# Patient Record
Sex: FEMALE | Race: OTHER | Employment: OTHER | URBAN - NONMETROPOLITAN AREA
[De-identification: names, ages, dates, MRNs, and addresses within clinical notes are randomized per-mention and may not be internally consistent; named-entity substitution may affect disease eponyms.]

---

## 2019-01-02 ENCOUNTER — APPOINTMENT (OUTPATIENT)
Dept: CT IMAGING | Age: 79
DRG: 086 | End: 2019-01-02
Payer: MEDICARE

## 2019-01-02 ENCOUNTER — APPOINTMENT (OUTPATIENT)
Dept: GENERAL RADIOLOGY | Age: 79
DRG: 086 | End: 2019-01-02
Payer: MEDICARE

## 2019-01-02 ENCOUNTER — HOSPITAL ENCOUNTER (INPATIENT)
Age: 79
LOS: 2 days | Discharge: HOME | DRG: 086 | End: 2019-01-04
Attending: FAMILY MEDICINE | Admitting: SURGERY
Payer: MEDICARE

## 2019-01-02 DIAGNOSIS — S06.310A: ICD-10-CM
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
## 2019-01-02 DIAGNOSIS — I60.9 SAH (SUBARACHNOID HEMORRHAGE) (HCC): ICD-10-CM

## 2019-01-02 PROBLEM — S00.12XA PERIORBITAL ECCHYMOSIS OF LEFT EYE: Status: ACTIVE | Noted: 2019-01-02

## 2019-01-02 PROBLEM — S22.000A CLOSED COMPRESSION FRACTURE OF THORACIC VERTEBRA (HCC): Status: ACTIVE | Noted: 2019-01-02

## 2019-01-02 PROBLEM — F17.210 DEPENDENCE ON NICOTINE FROM CIGARETTES: Status: ACTIVE | Noted: 2019-01-02

## 2019-01-02 PROBLEM — S32.010A CLOSED COMPRESSION FRACTURE OF FIRST LUMBAR VERTEBRA (HCC): Status: ACTIVE | Noted: 2019-01-02

## 2019-01-02 LAB
MRSA SCREEN RT-PCR: NEGATIVE
VANCOMYCIN RESISTANT ENTEROCOCCUS: NEGATIVE

## 2019-01-02 PROCEDURE — 2709999900 HC NON-CHARGEABLE SUPPLY

## 2019-01-02 PROCEDURE — 3209999900 CT INTERPRETATION OF OUTSIDE IMAGES

## 2019-01-02 PROCEDURE — APPSS180 APP SPLIT SHARED TIME > 60 MINUTES: Performed by: NURSE PRACTITIONER

## 2019-01-02 PROCEDURE — 6370000000 HC RX 637 (ALT 250 FOR IP): Performed by: INTERNAL MEDICINE

## 2019-01-02 PROCEDURE — 76705 ECHO EXAM OF ABDOMEN: CPT

## 2019-01-02 PROCEDURE — 99285 EMERGENCY DEPT VISIT HI MDM: CPT

## 2019-01-02 PROCEDURE — 2000000000 HC ICU R&B

## 2019-01-02 PROCEDURE — 87500 VANOMYCIN DNA AMP PROBE: CPT

## 2019-01-02 PROCEDURE — 2580000003 HC RX 258: Performed by: FAMILY MEDICINE

## 2019-01-02 PROCEDURE — 99221 1ST HOSP IP/OBS SF/LOW 40: CPT | Performed by: NEUROLOGICAL SURGERY

## 2019-01-02 PROCEDURE — 3209999900

## 2019-01-02 PROCEDURE — 71260 CT THORAX DX C+: CPT

## 2019-01-02 PROCEDURE — 73130 X-RAY EXAM OF HAND: CPT

## 2019-01-02 PROCEDURE — 87641 MR-STAPH DNA AMP PROBE: CPT

## 2019-01-02 PROCEDURE — 87081 CULTURE SCREEN ONLY: CPT

## 2019-01-02 PROCEDURE — 99223 1ST HOSP IP/OBS HIGH 75: CPT | Performed by: SURGERY

## 2019-01-02 PROCEDURE — 74177 CT ABD & PELVIS W/CONTRAST: CPT

## 2019-01-02 PROCEDURE — 6820000002 HC L2 INJURY CALL ACTIVATION

## 2019-01-02 PROCEDURE — 3209999900 XR INTERPRETATION OUTSIDE FILMS

## 2019-01-02 PROCEDURE — 6360000004 HC RX CONTRAST MEDICATION: Performed by: FAMILY MEDICINE

## 2019-01-02 RX ORDER — ACETAMINOPHEN 325 MG/1
650 TABLET ORAL EVERY 4 HOURS PRN
Status: DISCONTINUED | OUTPATIENT
Start: 2019-01-02 | End: 2019-01-05 | Stop reason: HOSPADM

## 2019-01-02 RX ORDER — HYDROCODONE BITARTRATE AND ACETAMINOPHEN 5; 325 MG/1; MG/1
2 TABLET ORAL EVERY 4 HOURS PRN
Status: DISCONTINUED | OUTPATIENT
Start: 2019-01-02 | End: 2019-01-05 | Stop reason: HOSPADM

## 2019-01-02 RX ORDER — LISINOPRIL 20 MG/1
20 TABLET ORAL DAILY
Status: DISCONTINUED | OUTPATIENT
Start: 2019-01-02 | End: 2019-01-05 | Stop reason: HOSPADM

## 2019-01-02 RX ORDER — SODIUM CHLORIDE 0.9 % (FLUSH) 0.9 %
10 SYRINGE (ML) INJECTION PRN
Status: DISCONTINUED | OUTPATIENT
Start: 2019-01-02 | End: 2019-01-05 | Stop reason: HOSPADM

## 2019-01-02 RX ORDER — ALENDRONATE SODIUM 70 MG/1
70 TABLET ORAL
COMMUNITY

## 2019-01-02 RX ORDER — ALENDRONATE SODIUM 70 MG/1
70 TABLET ORAL
Status: DISCONTINUED | OUTPATIENT
Start: 2019-01-02 | End: 2019-01-02 | Stop reason: RX

## 2019-01-02 RX ORDER — SODIUM CHLORIDE 0.9 % (FLUSH) 0.9 %
10 SYRINGE (ML) INJECTION EVERY 12 HOURS SCHEDULED
Status: DISCONTINUED | OUTPATIENT
Start: 2019-01-02 | End: 2019-01-05 | Stop reason: HOSPADM

## 2019-01-02 RX ORDER — HYDROCODONE BITARTRATE AND ACETAMINOPHEN 5; 325 MG/1; MG/1
1 TABLET ORAL EVERY 4 HOURS PRN
Status: DISCONTINUED | OUTPATIENT
Start: 2019-01-02 | End: 2019-01-05 | Stop reason: HOSPADM

## 2019-01-02 RX ORDER — HYDROCHLOROTHIAZIDE 25 MG/1
12.5 TABLET ORAL DAILY
Status: DISCONTINUED | OUTPATIENT
Start: 2019-01-02 | End: 2019-01-05 | Stop reason: HOSPADM

## 2019-01-02 RX ORDER — DOCUSATE SODIUM 100 MG/1
100 CAPSULE, LIQUID FILLED ORAL 2 TIMES DAILY
Status: DISCONTINUED | OUTPATIENT
Start: 2019-01-02 | End: 2019-01-05 | Stop reason: HOSPADM

## 2019-01-02 RX ORDER — FAMOTIDINE 20 MG/1
20 TABLET, FILM COATED ORAL 2 TIMES DAILY
Status: DISCONTINUED | OUTPATIENT
Start: 2019-01-02 | End: 2019-01-05 | Stop reason: HOSPADM

## 2019-01-02 RX ORDER — LISINOPRIL AND HYDROCHLOROTHIAZIDE 20; 12.5 MG/1; MG/1
1 TABLET ORAL DAILY
Status: DISCONTINUED | OUTPATIENT
Start: 2019-01-02 | End: 2019-01-02 | Stop reason: SDUPTHER

## 2019-01-02 RX ORDER — NICOTINE 21 MG/24HR
1 PATCH, TRANSDERMAL 24 HOURS TRANSDERMAL DAILY
Status: DISCONTINUED | OUTPATIENT
Start: 2019-01-02 | End: 2019-01-05 | Stop reason: HOSPADM

## 2019-01-02 RX ORDER — BISACODYL 10 MG
10 SUPPOSITORY, RECTAL RECTAL DAILY PRN
Status: DISCONTINUED | OUTPATIENT
Start: 2019-01-02 | End: 2019-01-05 | Stop reason: HOSPADM

## 2019-01-02 RX ORDER — MORPHINE SULFATE 4 MG/ML
4 INJECTION, SOLUTION INTRAMUSCULAR; INTRAVENOUS
Status: DISCONTINUED | OUTPATIENT
Start: 2019-01-02 | End: 2019-01-05 | Stop reason: HOSPADM

## 2019-01-02 RX ORDER — SODIUM CHLORIDE 9 MG/ML
INJECTION, SOLUTION INTRAVENOUS CONTINUOUS
Status: DISCONTINUED | OUTPATIENT
Start: 2019-01-02 | End: 2019-01-05 | Stop reason: HOSPADM

## 2019-01-02 RX ORDER — MORPHINE SULFATE 2 MG/ML
2 INJECTION, SOLUTION INTRAMUSCULAR; INTRAVENOUS
Status: DISCONTINUED | OUTPATIENT
Start: 2019-01-02 | End: 2019-01-05 | Stop reason: HOSPADM

## 2019-01-02 RX ORDER — SODIUM CHLORIDE 9 MG/ML
INJECTION, SOLUTION INTRAVENOUS CONTINUOUS
Status: DISCONTINUED | OUTPATIENT
Start: 2019-01-02 | End: 2019-01-02 | Stop reason: SDUPTHER

## 2019-01-02 RX ORDER — ONDANSETRON 2 MG/ML
4 INJECTION INTRAMUSCULAR; INTRAVENOUS EVERY 6 HOURS PRN
Status: DISCONTINUED | OUTPATIENT
Start: 2019-01-02 | End: 2019-01-05 | Stop reason: HOSPADM

## 2019-01-02 RX ADMIN — IOPAMIDOL 80 ML: 755 INJECTION, SOLUTION INTRAVENOUS at 14:06

## 2019-01-02 RX ADMIN — SODIUM CHLORIDE: 9 INJECTION, SOLUTION INTRAVENOUS at 12:48

## 2019-01-02 RX ADMIN — HYDROCHLOROTHIAZIDE 12.5 MG: 25 TABLET ORAL at 20:59

## 2019-01-02 RX ADMIN — LISINOPRIL 20 MG: 20 TABLET ORAL at 20:59

## 2019-01-02 ASSESSMENT — ENCOUNTER SYMPTOMS
CHEST TIGHTNESS: 0
DIARRHEA: 0
FACIAL SWELLING: 1
ABDOMINAL PAIN: 0
SHORTNESS OF BREATH: 0
TROUBLE SWALLOWING: 0
RHINORRHEA: 0
ABDOMINAL DISTENTION: 0
BACK PAIN: 0
FACIAL SWELLING: 0
CHOKING: 0
SORE THROAT: 0
SINUS PRESSURE: 0
EYE DISCHARGE: 0
STRIDOR: 0
VOMITING: 0
EYE PAIN: 0
VOICE CHANGE: 0
COLOR CHANGE: 0
WHEEZING: 0
NAUSEA: 0
APNEA: 0
EYE ITCHING: 0
COUGH: 0
CONSTIPATION: 0
PHOTOPHOBIA: 0
EYE REDNESS: 0
BLOOD IN STOOL: 0

## 2019-01-03 ENCOUNTER — APPOINTMENT (OUTPATIENT)
Dept: CT IMAGING | Age: 79
DRG: 086 | End: 2019-01-03
Payer: MEDICARE

## 2019-01-03 LAB
ANION GAP SERPL CALCULATED.3IONS-SCNC: 10 MEQ/L (ref 8–16)
BUN BLDV-MCNC: 8 MG/DL (ref 7–22)
CALCIUM SERPL-MCNC: 9 MG/DL (ref 8.5–10.5)
CHLORIDE BLD-SCNC: 104 MEQ/L (ref 98–111)
CO2: 25 MEQ/L (ref 23–33)
CREAT SERPL-MCNC: 0.6 MG/DL (ref 0.4–1.2)
ERYTHROCYTE [DISTWIDTH] IN BLOOD BY AUTOMATED COUNT: 13.1 % (ref 11.5–14.5)
ERYTHROCYTE [DISTWIDTH] IN BLOOD BY AUTOMATED COUNT: 43.3 FL (ref 35–45)
GFR SERPL CREATININE-BSD FRML MDRD: > 90 ML/MIN/1.73M2
GLUCOSE BLD-MCNC: 143 MG/DL (ref 70–108)
HCT VFR BLD CALC: 39.1 % (ref 37–47)
HEMOGLOBIN: 13.2 GM/DL (ref 12–16)
MCH RBC QN AUTO: 30.6 PG (ref 26–33)
MCHC RBC AUTO-ENTMCNC: 33.8 GM/DL (ref 32.2–35.5)
MCV RBC AUTO: 90.7 FL (ref 81–99)
PLATELET # BLD: 250 THOU/MM3 (ref 130–400)
PMV BLD AUTO: 10.4 FL (ref 9.4–12.4)
POTASSIUM REFLEX MAGNESIUM: 3.9 MEQ/L (ref 3.5–5.2)
RBC # BLD: 4.31 MILL/MM3 (ref 4.2–5.4)
SODIUM BLD-SCNC: 139 MEQ/L (ref 135–145)
WBC # BLD: 10.9 THOU/MM3 (ref 4.8–10.8)

## 2019-01-03 PROCEDURE — G8989 SELF CARE D/C STATUS: HCPCS

## 2019-01-03 PROCEDURE — G8987 SELF CARE CURRENT STATUS: HCPCS

## 2019-01-03 PROCEDURE — APPSS45 APP SPLIT SHARED TIME 31-45 MINUTES: Performed by: PHYSICIAN ASSISTANT

## 2019-01-03 PROCEDURE — G8978 MOBILITY CURRENT STATUS: HCPCS

## 2019-01-03 PROCEDURE — 6370000000 HC RX 637 (ALT 250 FOR IP): Performed by: NURSE PRACTITIONER

## 2019-01-03 PROCEDURE — G8979 MOBILITY GOAL STATUS: HCPCS

## 2019-01-03 PROCEDURE — 2709999900 HC NON-CHARGEABLE SUPPLY

## 2019-01-03 PROCEDURE — 97535 SELF CARE MNGMENT TRAINING: CPT

## 2019-01-03 PROCEDURE — 97166 OT EVAL MOD COMPLEX 45 MIN: CPT

## 2019-01-03 PROCEDURE — 97110 THERAPEUTIC EXERCISES: CPT

## 2019-01-03 PROCEDURE — 97162 PT EVAL MOD COMPLEX 30 MIN: CPT

## 2019-01-03 PROCEDURE — 2580000003 HC RX 258: Performed by: NURSE PRACTITIONER

## 2019-01-03 PROCEDURE — 99232 SBSQ HOSP IP/OBS MODERATE 35: CPT | Performed by: SURGERY

## 2019-01-03 PROCEDURE — 80048 BASIC METABOLIC PNL TOTAL CA: CPT

## 2019-01-03 PROCEDURE — 92523 SPEECH SOUND LANG COMPREHEN: CPT

## 2019-01-03 PROCEDURE — 2060000000 HC ICU INTERMEDIATE R&B

## 2019-01-03 PROCEDURE — 99231 SBSQ HOSP IP/OBS SF/LOW 25: CPT | Performed by: PHYSICIAN ASSISTANT

## 2019-01-03 PROCEDURE — 36415 COLL VENOUS BLD VENIPUNCTURE: CPT

## 2019-01-03 PROCEDURE — G8988 SELF CARE GOAL STATUS: HCPCS

## 2019-01-03 PROCEDURE — 85027 COMPLETE CBC AUTOMATED: CPT

## 2019-01-03 PROCEDURE — 6370000000 HC RX 637 (ALT 250 FOR IP): Performed by: INTERNAL MEDICINE

## 2019-01-03 PROCEDURE — 70450 CT HEAD/BRAIN W/O DYE: CPT

## 2019-01-03 RX ADMIN — DOCUSATE SODIUM 100 MG: 100 CAPSULE, LIQUID FILLED ORAL at 20:18

## 2019-01-03 RX ADMIN — Medication 10 ML: at 08:31

## 2019-01-03 RX ADMIN — FAMOTIDINE 20 MG: 20 TABLET ORAL at 20:18

## 2019-01-03 RX ADMIN — FAMOTIDINE 20 MG: 20 TABLET ORAL at 08:31

## 2019-01-03 RX ADMIN — LISINOPRIL 20 MG: 20 TABLET ORAL at 08:31

## 2019-01-03 RX ADMIN — HYDROCHLOROTHIAZIDE 12.5 MG: 25 TABLET ORAL at 08:31

## 2019-01-03 RX ADMIN — ACETAMINOPHEN 650 MG: 325 TABLET ORAL at 20:23

## 2019-01-03 RX ADMIN — Medication 10 ML: at 20:20

## 2019-01-03 RX ADMIN — DOCUSATE SODIUM 100 MG: 100 CAPSULE, LIQUID FILLED ORAL at 08:31

## 2019-01-03 ASSESSMENT — ENCOUNTER SYMPTOMS
BACK PAIN: 0
ABDOMINAL PAIN: 0
SHORTNESS OF BREATH: 0
CHEST TIGHTNESS: 0
ABDOMINAL DISTENTION: 0

## 2019-01-03 ASSESSMENT — PAIN SCALES - GENERAL
PAINLEVEL_OUTOF10: 3
PAINLEVEL_OUTOF10: 0
PAINLEVEL_OUTOF10: 0
PAINLEVEL_OUTOF10: 3

## 2019-01-04 VITALS
BODY MASS INDEX: 27.16 KG/M2 | RESPIRATION RATE: 16 BRPM | OXYGEN SATURATION: 95 % | WEIGHT: 163 LBS | SYSTOLIC BLOOD PRESSURE: 122 MMHG | HEIGHT: 65 IN | TEMPERATURE: 97.8 F | HEART RATE: 94 BPM | DIASTOLIC BLOOD PRESSURE: 81 MMHG

## 2019-01-04 LAB — MRSA SCREEN: NORMAL

## 2019-01-04 PROCEDURE — 97116 GAIT TRAINING THERAPY: CPT

## 2019-01-04 PROCEDURE — 97110 THERAPEUTIC EXERCISES: CPT

## 2019-01-04 PROCEDURE — 6370000000 HC RX 637 (ALT 250 FOR IP): Performed by: INTERNAL MEDICINE

## 2019-01-04 PROCEDURE — 97127 HC SP THER IVNTJ W/FOCUS COG FUNCJ: CPT

## 2019-01-04 PROCEDURE — 6370000000 HC RX 637 (ALT 250 FOR IP): Performed by: NURSE PRACTITIONER

## 2019-01-04 PROCEDURE — 99231 SBSQ HOSP IP/OBS SF/LOW 25: CPT | Performed by: PHYSICIAN ASSISTANT

## 2019-01-04 PROCEDURE — 2580000003 HC RX 258: Performed by: NURSE PRACTITIONER

## 2019-01-04 RX ORDER — HYDROCODONE BITARTRATE AND ACETAMINOPHEN 5; 325 MG/1; MG/1
1-2 TABLET ORAL EVERY 4 HOURS PRN
Qty: 25 TABLET | Refills: 0 | Status: SHIPPED | OUTPATIENT
Start: 2019-01-04 | End: 2019-01-07

## 2019-01-04 RX ADMIN — FAMOTIDINE 20 MG: 20 TABLET ORAL at 08:54

## 2019-01-04 RX ADMIN — Medication 10 ML: at 08:54

## 2019-01-04 RX ADMIN — LISINOPRIL 20 MG: 20 TABLET ORAL at 08:53

## 2019-01-04 RX ADMIN — DOCUSATE SODIUM 100 MG: 100 CAPSULE, LIQUID FILLED ORAL at 21:17

## 2019-01-04 RX ADMIN — HYDROCHLOROTHIAZIDE 12.5 MG: 25 TABLET ORAL at 08:53

## 2019-01-04 RX ADMIN — FAMOTIDINE 20 MG: 20 TABLET ORAL at 21:17

## 2019-01-04 RX ADMIN — DOCUSATE SODIUM 100 MG: 100 CAPSULE, LIQUID FILLED ORAL at 08:54

## 2019-01-04 ASSESSMENT — PAIN DESCRIPTION - PAIN TYPE: TYPE: ACUTE PAIN

## 2019-01-04 ASSESSMENT — PAIN SCALES - GENERAL: PAINLEVEL_OUTOF10: 0

## 2019-01-04 ASSESSMENT — PAIN DESCRIPTION - ORIENTATION: ORIENTATION: LEFT

## 2019-01-04 ASSESSMENT — PAIN DESCRIPTION - LOCATION: LOCATION: HAND
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
## 2019-10-29 ENCOUNTER — APPOINTMENT (OUTPATIENT)
Dept: CT IMAGING | Age: 79
DRG: 086 | End: 2019-10-29
Payer: MEDICARE

## 2019-10-29 ENCOUNTER — HOSPITAL ENCOUNTER (INPATIENT)
Age: 79
LOS: 3 days | Discharge: NURSING, REHAB OR LONG-TERM CARE | DRG: 086 | End: 2019-11-01
Attending: FAMILY MEDICINE | Admitting: SURGERY
Payer: MEDICARE
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
## 2019-10-29 PROBLEM — S22.060A COMPRESSION FRACTURE OF T8 VERTEBRA (HCC): Status: ACTIVE | Noted: 2019-10-29

## 2019-10-29 PROBLEM — S09.90XA CLOSED HEAD INJURY: Status: ACTIVE | Noted: 2019-10-29

## 2019-10-29 PROBLEM — I60.9 SUBARACHNOID BLEED (HCC): Status: ACTIVE | Noted: 2019-10-29

## 2019-10-29 PROBLEM — I62.00 SUBDURAL BLEEDING (HCC): Status: ACTIVE | Noted: 2019-10-29

## 2019-10-29 LAB
ALBUMIN SERPL-MCNC: 3.6 G/DL (ref 3.5–5.1)
ALP BLD-CCNC: 92 U/L (ref 38–126)
ALT SERPL-CCNC: 34 U/L (ref 11–66)
AMORPHOUS: ABNORMAL
ANION GAP SERPL CALCULATED.3IONS-SCNC: 12 MEQ/L (ref 8–16)
ANION GAP SERPL CALCULATED.3IONS-SCNC: 14 MEQ/L (ref 8–16)
AST SERPL-CCNC: 33 U/L (ref 5–40)
AVERAGE GLUCOSE: 147 MG/DL (ref 70–126)
BACTERIA: ABNORMAL
BILIRUB SERPL-MCNC: 0.7 MG/DL (ref 0.3–1.2)
BILIRUBIN URINE: ABNORMAL
BLOOD, URINE: ABNORMAL
BUN BLDV-MCNC: 11 MG/DL (ref 7–22)
BUN BLDV-MCNC: 14 MG/DL (ref 7–22)
CALCIUM SERPL-MCNC: 8.9 MG/DL (ref 8.5–10.5)
CALCIUM SERPL-MCNC: 9.3 MG/DL (ref 8.5–10.5)
CASTS: ABNORMAL /LPF
CASTS: ABNORMAL /LPF
CHARACTER, URINE: CLEAR
CHLORIDE BLD-SCNC: 103 MEQ/L (ref 98–111)
CHLORIDE BLD-SCNC: 104 MEQ/L (ref 98–111)
CO2: 24 MEQ/L (ref 23–33)
CO2: 25 MEQ/L (ref 23–33)
COLOR: YELLOW
CREAT SERPL-MCNC: 0.5 MG/DL (ref 0.4–1.2)
CREAT SERPL-MCNC: 0.6 MG/DL (ref 0.4–1.2)
CRYSTALS: ABNORMAL
EPITHELIAL CELLS, UA: ABNORMAL /HPF
ERYTHROCYTE [DISTWIDTH] IN BLOOD BY AUTOMATED COUNT: 13.3 % (ref 11.5–14.5)
ERYTHROCYTE [DISTWIDTH] IN BLOOD BY AUTOMATED COUNT: 43.6 FL (ref 35–45)
GFR SERPL CREATININE-BSD FRML MDRD: > 90 ML/MIN/1.73M2
GFR SERPL CREATININE-BSD FRML MDRD: > 90 ML/MIN/1.73M2
GLUCOSE BLD-MCNC: 150 MG/DL (ref 70–108)
GLUCOSE BLD-MCNC: 162 MG/DL (ref 70–108)
GLUCOSE, URINE: NEGATIVE MG/DL
HBA1C MFR BLD: 6.9 % (ref 4.4–6.4)
HCT VFR BLD CALC: 41.8 % (ref 37–47)
HEMOGLOBIN: 13.8 GM/DL (ref 12–16)
ICTOTEST: NEGATIVE
KETONES, URINE: NEGATIVE
LEUKOCYTE EST, POC: NEGATIVE
MAGNESIUM: 2.1 MG/DL (ref 1.6–2.4)
MCH RBC QN AUTO: 29.7 PG (ref 26–33)
MCHC RBC AUTO-ENTMCNC: 33 GM/DL (ref 32.2–35.5)
MCV RBC AUTO: 90.1 FL (ref 81–99)
MISCELLANEOUS LAB TEST RESULT: ABNORMAL
MRSA SCREEN RT-PCR: NEGATIVE
MUCUS: ABNORMAL
NITRITE, URINE: NEGATIVE
PH UA: 7 (ref 5–9)
PLATELET # BLD: 245 THOU/MM3 (ref 130–400)
PMV BLD AUTO: 10.4 FL (ref 9.4–12.4)
POTASSIUM REFLEX MAGNESIUM: 3.1 MEQ/L (ref 3.5–5.2)
POTASSIUM SERPL-SCNC: 3.9 MEQ/L (ref 3.5–5.2)
PROTEIN UA: ABNORMAL MG/DL
RBC # BLD: 4.64 MILL/MM3 (ref 4.2–5.4)
RBC URINE: ABNORMAL /HPF
RENAL EPITHELIAL, UA: ABNORMAL
SODIUM BLD-SCNC: 141 MEQ/L (ref 135–145)
SODIUM BLD-SCNC: 141 MEQ/L (ref 135–145)
SPECIFIC GRAVITY UA: > 1.03 (ref 1–1.03)
TOTAL PROTEIN: 7 G/DL (ref 6.1–8)
UROBILINOGEN, URINE: 1 EU/DL (ref 0–1)
VANCOMYCIN RESISTANT ENTEROCOCCUS: NEGATIVE
WBC # BLD: 10.2 THOU/MM3 (ref 4.8–10.8)
WBC UA: ABNORMAL /HPF
YEAST: ABNORMAL

## 2019-10-29 PROCEDURE — 99222 1ST HOSP IP/OBS MODERATE 55: CPT | Performed by: SURGERY

## 2019-10-29 PROCEDURE — 2580000003 HC RX 258: Performed by: PHYSICIAN ASSISTANT

## 2019-10-29 PROCEDURE — 87500 VANOMYCIN DNA AMP PROBE: CPT

## 2019-10-29 PROCEDURE — 6370000000 HC RX 637 (ALT 250 FOR IP): Performed by: PHYSICIAN ASSISTANT

## 2019-10-29 PROCEDURE — L0120 CERV FLEX N/ADJ FOAM PRE OTS: HCPCS

## 2019-10-29 PROCEDURE — 2580000003 HC RX 258: Performed by: FAMILY MEDICINE

## 2019-10-29 PROCEDURE — 2709999900 HC NON-CHARGEABLE SUPPLY

## 2019-10-29 PROCEDURE — 2060000000 HC ICU INTERMEDIATE R&B

## 2019-10-29 PROCEDURE — 99223 1ST HOSP IP/OBS HIGH 75: CPT | Performed by: INTERNAL MEDICINE

## 2019-10-29 PROCEDURE — 87086 URINE CULTURE/COLONY COUNT: CPT

## 2019-10-29 PROCEDURE — 6820000002 HC L2 INJURY CALL ACTIVATION: Performed by: SURGERY

## 2019-10-29 PROCEDURE — 99285 EMERGENCY DEPT VISIT HI MDM: CPT

## 2019-10-29 PROCEDURE — 92610 EVALUATE SWALLOWING FUNCTION: CPT

## 2019-10-29 PROCEDURE — 94761 N-INVAS EAR/PLS OXIMETRY MLT: CPT

## 2019-10-29 PROCEDURE — 80048 BASIC METABOLIC PNL TOTAL CA: CPT

## 2019-10-29 PROCEDURE — 3209999900 CT INTERPRETATION OF OUTSIDE IMAGES

## 2019-10-29 PROCEDURE — 87081 CULTURE SCREEN ONLY: CPT

## 2019-10-29 PROCEDURE — 87641 MR-STAPH DNA AMP PROBE: CPT

## 2019-10-29 PROCEDURE — 83036 HEMOGLOBIN GLYCOSYLATED A1C: CPT

## 2019-10-29 PROCEDURE — 36415 COLL VENOUS BLD VENIPUNCTURE: CPT

## 2019-10-29 PROCEDURE — 83735 ASSAY OF MAGNESIUM: CPT

## 2019-10-29 PROCEDURE — 80053 COMPREHEN METABOLIC PANEL: CPT

## 2019-10-29 PROCEDURE — 6370000000 HC RX 637 (ALT 250 FOR IP): Performed by: SURGERY

## 2019-10-29 PROCEDURE — 6360000002 HC RX W HCPCS: Performed by: PHYSICIAN ASSISTANT

## 2019-10-29 PROCEDURE — 81001 URINALYSIS AUTO W/SCOPE: CPT

## 2019-10-29 PROCEDURE — 85027 COMPLETE CBC AUTOMATED: CPT

## 2019-10-29 PROCEDURE — 70450 CT HEAD/BRAIN W/O DYE: CPT

## 2019-10-29 PROCEDURE — APPSS180 APP SPLIT SHARED TIME > 60 MINUTES: Performed by: PHYSICIAN ASSISTANT

## 2019-10-29 RX ORDER — SODIUM CHLORIDE 9 MG/ML
INJECTION, SOLUTION INTRAVENOUS CONTINUOUS
Status: DISCONTINUED | OUTPATIENT
Start: 2019-10-29 | End: 2019-10-31

## 2019-10-29 RX ORDER — TRAMADOL HYDROCHLORIDE 50 MG/1
50 TABLET ORAL EVERY 6 HOURS PRN
Status: DISCONTINUED | OUTPATIENT
Start: 2019-10-29 | End: 2019-11-01 | Stop reason: HOSPADM

## 2019-10-29 RX ORDER — MORPHINE SULFATE 4 MG/ML
4 INJECTION, SOLUTION INTRAMUSCULAR; INTRAVENOUS
Status: DISCONTINUED | OUTPATIENT
Start: 2019-10-29 | End: 2019-11-01 | Stop reason: HOSPADM

## 2019-10-29 RX ORDER — HYDROCODONE BITARTRATE AND ACETAMINOPHEN 5; 325 MG/1; MG/1
1 TABLET ORAL EVERY 4 HOURS PRN
Status: DISCONTINUED | OUTPATIENT
Start: 2019-10-29 | End: 2019-10-29

## 2019-10-29 RX ORDER — HYDROCODONE BITARTRATE AND ACETAMINOPHEN 5; 325 MG/1; MG/1
2 TABLET ORAL EVERY 4 HOURS PRN
Status: DISCONTINUED | OUTPATIENT
Start: 2019-10-29 | End: 2019-10-29

## 2019-10-29 RX ORDER — LISINOPRIL AND HYDROCHLOROTHIAZIDE 20; 12.5 MG/1; MG/1
1 TABLET ORAL DAILY
Status: DISCONTINUED | OUTPATIENT
Start: 2019-10-29 | End: 2019-10-29 | Stop reason: SDUPTHER

## 2019-10-29 RX ORDER — DOCUSATE SODIUM 100 MG/1
100 CAPSULE, LIQUID FILLED ORAL 2 TIMES DAILY
Status: DISCONTINUED | OUTPATIENT
Start: 2019-10-29 | End: 2019-11-01 | Stop reason: HOSPADM

## 2019-10-29 RX ORDER — MORPHINE SULFATE 2 MG/ML
2 INJECTION, SOLUTION INTRAMUSCULAR; INTRAVENOUS
Status: DISCONTINUED | OUTPATIENT
Start: 2019-10-29 | End: 2019-11-01 | Stop reason: HOSPADM

## 2019-10-29 RX ORDER — POTASSIUM CHLORIDE 7.45 MG/ML
10 INJECTION INTRAVENOUS
Status: COMPLETED | OUTPATIENT
Start: 2019-10-29 | End: 2019-10-29

## 2019-10-29 RX ORDER — HYDRALAZINE HYDROCHLORIDE 20 MG/ML
5 INJECTION INTRAMUSCULAR; INTRAVENOUS EVERY 6 HOURS PRN
Status: DISCONTINUED | OUTPATIENT
Start: 2019-10-29 | End: 2019-11-01 | Stop reason: HOSPADM

## 2019-10-29 RX ORDER — HYDROCHLOROTHIAZIDE 12.5 MG/1
12.5 CAPSULE, GELATIN COATED ORAL DAILY
Status: DISCONTINUED | OUTPATIENT
Start: 2019-10-29 | End: 2019-11-01 | Stop reason: HOSPADM

## 2019-10-29 RX ORDER — SODIUM CHLORIDE 0.9 % (FLUSH) 0.9 %
10 SYRINGE (ML) INJECTION PRN
Status: DISCONTINUED | OUTPATIENT
Start: 2019-10-29 | End: 2019-11-01 | Stop reason: HOSPADM

## 2019-10-29 RX ORDER — ACETAMINOPHEN 325 MG/1
650 TABLET ORAL EVERY 4 HOURS PRN
Status: DISCONTINUED | OUTPATIENT
Start: 2019-10-29 | End: 2019-10-29

## 2019-10-29 RX ORDER — TRAMADOL HYDROCHLORIDE 50 MG/1
100 TABLET ORAL EVERY 6 HOURS PRN
Status: DISCONTINUED | OUTPATIENT
Start: 2019-10-29 | End: 2019-11-01 | Stop reason: HOSPADM

## 2019-10-29 RX ORDER — ONDANSETRON 2 MG/ML
4 INJECTION INTRAMUSCULAR; INTRAVENOUS EVERY 6 HOURS PRN
Status: DISCONTINUED | OUTPATIENT
Start: 2019-10-29 | End: 2019-11-01 | Stop reason: HOSPADM

## 2019-10-29 RX ORDER — LISINOPRIL 20 MG/1
20 TABLET ORAL DAILY
Status: DISCONTINUED | OUTPATIENT
Start: 2019-10-29 | End: 2019-11-01 | Stop reason: HOSPADM

## 2019-10-29 RX ORDER — SODIUM CHLORIDE 0.9 % (FLUSH) 0.9 %
10 SYRINGE (ML) INJECTION EVERY 12 HOURS SCHEDULED
Status: DISCONTINUED | OUTPATIENT
Start: 2019-10-29 | End: 2019-11-01 | Stop reason: HOSPADM

## 2019-10-29 RX ORDER — SODIUM CHLORIDE 9 MG/ML
INJECTION, SOLUTION INTRAVENOUS CONTINUOUS
Status: DISCONTINUED | OUTPATIENT
Start: 2019-10-29 | End: 2019-10-29 | Stop reason: HOSPADM

## 2019-10-29 RX ORDER — FAMOTIDINE 20 MG/1
20 TABLET, FILM COATED ORAL 2 TIMES DAILY
Status: DISCONTINUED | OUTPATIENT
Start: 2019-10-29 | End: 2019-11-01 | Stop reason: HOSPADM

## 2019-10-29 RX ADMIN — POTASSIUM CHLORIDE 10 MEQ: 7.46 INJECTION, SOLUTION INTRAVENOUS at 09:14

## 2019-10-29 RX ADMIN — FAMOTIDINE 20 MG: 20 TABLET ORAL at 21:18

## 2019-10-29 RX ADMIN — POTASSIUM CHLORIDE 10 MEQ: 7.46 INJECTION, SOLUTION INTRAVENOUS at 08:01

## 2019-10-29 RX ADMIN — HYDROCHLOROTHIAZIDE 12.5 MG: 12.5 CAPSULE ORAL at 08:21

## 2019-10-29 RX ADMIN — Medication 10 ML: at 21:47

## 2019-10-29 RX ADMIN — DOCUSATE SODIUM 100 MG: 100 CAPSULE, LIQUID FILLED ORAL at 21:18

## 2019-10-29 RX ADMIN — POTASSIUM CHLORIDE 10 MEQ: 7.46 INJECTION, SOLUTION INTRAVENOUS at 12:00

## 2019-10-29 RX ADMIN — Medication 10 ML: at 08:05

## 2019-10-29 RX ADMIN — POTASSIUM CHLORIDE 10 MEQ: 7.46 INJECTION, SOLUTION INTRAVENOUS at 10:45

## 2019-10-29 RX ADMIN — SODIUM CHLORIDE: 9 INJECTION, SOLUTION INTRAVENOUS at 02:11

## 2019-10-29 RX ADMIN — FAMOTIDINE 20 MG: 20 TABLET ORAL at 08:22

## 2019-10-29 RX ADMIN — LISINOPRIL 20 MG: 20 TABLET ORAL at 08:21

## 2019-10-29 RX ADMIN — SODIUM CHLORIDE: 9 INJECTION, SOLUTION INTRAVENOUS at 16:13

## 2019-10-29 ASSESSMENT — PAIN SCALES - GENERAL
PAINLEVEL_OUTOF10: 0

## 2019-10-29 ASSESSMENT — ENCOUNTER SYMPTOMS
ABDOMINAL PAIN: 0
EYE PAIN: 0
SORE THROAT: 0
SHORTNESS OF BREATH: 0
WHEEZING: 0
SINUS PRESSURE: 0
EYE DISCHARGE: 0
BACK PAIN: 1
EYE REDNESS: 0
PHOTOPHOBIA: 0
VOMITING: 0
STRIDOR: 0
NAUSEA: 1
BACK PAIN: 0
FACIAL SWELLING: 0
NAUSEA: 0

## 2019-10-29 ASSESSMENT — PAIN DESCRIPTION - ORIENTATION: ORIENTATION: RIGHT

## 2019-10-29 ASSESSMENT — PAIN DESCRIPTION - PAIN TYPE: TYPE: ACUTE PAIN

## 2019-10-29 ASSESSMENT — PAIN DESCRIPTION - LOCATION: LOCATION: RIB CAGE

## 2019-10-29 NOTE — H&P
and she was slower at completing task/answering questions. Patient was pan scan at outlying facility and per the report patient was found to have bilateral frontal lobe subarachnoid hemorrhage with small subdural hemorrhage. Patient also was reported to have had C5 fracture, T8 fracture, and old T12 and L1 fractures. Upon arrival to our facility patient's ABCs were intact and she was alert with confusion noted. Patient noted confusion with time as she is unable to state the month or year they were in. Patient also noted to change answers multiple times when asked the same question. At first the patient had no complaints and denied any pain. She further denied having any headache, lightheaded, dizziness, changes in vision, neck pain, back pain, chest pain, shortness of breath, abdominal pain, nausea/vomiting, and paresthesias. Upon further questioning patient later endorsed having right-sided chest pain which she later denied and then endorsed only having knee pain and nausea. Patient did state that she remembers falling in the bathroom and denies loss of consciousness. On exam the patient's pupils were equal and reactive to light and patient denied midline tenderness palpation throughout spine. Patient followed simple commands. Cervical collar was in place. No visible signs of trauma were noted throughout. PMS intact in all 4 extremities. Patient able to locate areas of light sensation. Patient able to bend bilateral knees, raise legs minimally off bed, plantar and dorsiflexion intact, and  strength intact. Plan for patient to be admitted to the ICU under trauma surgery with neurosurgery consulted. CT interpretation of outside images ordered. Labs were reviewed from outside facility, CBC was unremarkable, BMP revealed potassium of 2.8, and troponin negative. Patient noted to have a history of diabetes and hypertension from outside report. Patient noted to take 81 mg of aspirin daily.  Per lower extremities. Skin: Skin warm and dry. Normal for ethnicity. Radiology:     CT INTERPRETATION OF OUTSIDE IMAGES   Final Result   No acute traumatic abnormality of the solid or hollow viscera of the abdomen and pelvis. No acute inflammatory or infectious process in the abdomen or pelvis. No evidence of bowel obstruction. Colonic diverticulosis without diverticulitis. Small amount of fluid in the pelvis. Masslike area in the central uterus which may represent a large fibroid although endometrial mass cannot be excluded. Recommend pelvic ultrasound correlation. Nonspecific bladder wall thickening. Probably acute versus subacute mild to moderate T8 compression fracture. Additional findings as detailed above. **This report has been created using voice recognition software. It may contain minor errors which are inherent in voice recognition technology. **      Final report electronically signed by Dr. Yazan Alvares on 10/29/2019 4:05 AM      CT INTERPRETATION OF OUTSIDE IMAGES   Final Result    Probably acute or subacute mild to moderate T8 compression fracture. Old appearing moderate to severe T12 and L1 burst fractures with very mild T12 spinal stenosis. Moderate L5-S1 degenerative disc disease. Multilevel mid and lower lumbar facet joint DJD. L3-4 annular bulge with moderate spinal stenosis. L4-5 mild annular bulge with possible impingement on the left L4 nerve root. See the accompanying abdomen pelvis CT report for discussion of nonspine findings. **This report has been created using voice recognition software. It may contain minor errors which are inherent in voice recognition technology. **         Final report electronically signed by Dr. Yazan Alvares on 10/29/2019 2:59 AM      CT INTERPRETATION OF OUTSIDE IMAGES   Final Result      Mild subarachnoid or subdural hemorrhage along the anterior falx, more pronounced on the left as discussed above.    No acute

## 2019-10-29 NOTE — ED NOTES
Bed: 002A  Expected date: 10/29/19  Expected time: 12:19 AM  Means of arrival: [facility]  Comments:     Shaji Sumner RN  10/29/19 0110

## 2019-10-29 NOTE — ED PROVIDER NOTES
[facility]  eMERGENCY dEPARTMENT eNCOUnter          CHIEF COMPLAINT     No chief complaint on file. Nurses Notes reviewed and I agree except as noted in the HPI. HISTORY OF PRESENT ILLNESS    Stevenson Vasquez is a 67 y.o. female who presents after multiple falls with subarachnoid hemorrhage    Location/Symptom: Subarachnoid hemorrhage  Timing/Onset: Over the last 3 days  Context/Setting: Community dwelling  History of dementia  Hypertensive diabetic  Multiple falls at home  Apparently today did not seem to be acting as sharp as usual  Was taken to [facility] where head CT showed small subarachnoid hemorrhage questionable small subdural at the posterior falx  CT of the cervical spine showed C5 fracture questionable age  CT reconstructions of the thoracic and lumbar spine showed what was felt to be a relatively new compression fracture of T8 with old fractures at T12 and L1  She was stable, GCS 15  Transferred to [facility] for trauma care  She is on aspirin 81 mg daily  Quality: She is had falls and generalized weakness  She denies any headache  She has been evaluated at [facility] and transferred here  Duration: Falls in the last 3 to 4 days  Modifying Factors: Cervical collar, transported here  Severity: 7/10    REVIEW OF SYSTEMS     Review of Systems   Constitutional: Negative for diaphoresis. HENT: Negative for facial swelling. Eyes: Negative for discharge. Respiratory: Negative for shortness of breath. Cardiovascular: Negative for chest pain. Gastrointestinal: Negative for abdominal pain. Musculoskeletal: Positive for back pain. Negative for neck pain. Skin: Negative for wound. Neurological: Negative for headaches. Hematological:        Takes aspirin 81 mg daily   Psychiatric/Behavioral:        Chronic dementia with poor memory          PAST MEDICAL HISTORY    has a past medical history of Hypertension.     SURGICAL HISTORY      has a past surgical history that includes Abdomen surgery. CURRENT MEDICATIONS       Previous Medications    ALENDRONATE (FOSAMAX) 70 MG TABLET    Take 70 mg by mouth every 7 days    ASPIRIN 81 MG TABLET    Take 1 tablet by mouth daily    LISINOPRIL-HYDROCHLOROTHIAZIDE (PRINZIDE;ZESTORETIC) 20-12.5 MG PER TABLET    Take 1 tablet by mouth daily       ALLERGIES     has No Known Allergies. FAMILY HISTORY     has no family status information on file. family history is not on file. SOCIAL HISTORY      reports that she has been smoking. She has never used smokeless tobacco. She reports that she does not drink alcohol or use drugs. PHYSICAL EXAM     INITIAL VITALS:  height is 5' 5\" (1.651 m) and weight is 163 lb (73.9 kg). Her oral temperature is 97.8 °F (36.6 °C). Her blood pressure is 155/87 (abnormal) and her pulse is 86. Her respiration is 18 and oxygen saturation is 94%. Physical Exam   Constitutional:   Patient is alert, GCS 15    Supine on the cot with a c-collar in place   HENT:   Head: Normocephalic and atraumatic. No palpable scalp swelling tenderness or bruising    No facial swelling   Eyes: Pupils are equal, round, and reactive to light. EOM are normal.   Neck:   Cervical Collar in place and is left in place. Trachea midline   Cardiovascular: Normal rate and regular rhythm. Pulmonary/Chest: Effort normal and breath sounds normal. She exhibits no tenderness. Not tender over the sternum, clavicles, ribs   Abdominal: Soft. There is no tenderness. Musculoskeletal: She exhibits no edema. Nontender over the arms and legs   Neurological: She is alert. She exhibits normal muscle tone. Skin: Skin is warm and dry. Psychiatric:   She is alert, answers questions reasonably well       Nursing note and vitals reviewed.         DIFFERENTIAL DIAGNOSIS:     Multiple falls, generalized weakness    Found to have small subarachnoid bleed in the brain along with questionable subdural in the falx    [facility] scans at [facility] did show also a C5 fracture, T8 fracture, and old fractures of T12 and L1    She is neurologically stable    She is hemodynamically stable    CTs of the abdomen and pelvis showed no acute intrathoracic or intra-abdominal blunt trauma injury        DIAGNOSTIC RESULTS        LABS:   Labs Reviewed - No data to display    EMERGENCY DEPARTMENT COURSE:   Vitals:    Vitals:    10/29/19 0112 10/29/19 0116   BP:  (!) 155/87   Pulse:  86   Resp:  18   Temp:  97.8 °F (36.6 °C)   TempSrc:  Oral   SpO2:  94%   Weight: 163 lb (73.9 kg)    Height: 5' 5\" (1.651 m)      Nursing notes reviewed     patient is alert GCS of 15    Hemodynamically stable    Level 2 trauma activation    FAST exam felt not indicated as she is Tereso been scanned chest abdomen and pelvis at outlying facility with no intrathoracic or intra-abdominal injury    Admit to trauma services. CRITICAL CARE:   none    CONSULTS:    Trauma services    PROCEDURES:  None    FINAL IMPRESSION      1. Fall, initial encounter    2. Subarachnoid hemorrhage following injury, no loss of consciousness, initial encounter ([address]    3. Closed fracture of fifth cervical vertebra without spinal cord injury, initial encounter ([address]    4. Closed wedge compression fracture of eighth thoracic vertebra, initial encounter ([address]          DISPOSITION/PLAN   Admit      PATIENT REFERRED TO:  No follow-up provider specified.     DISCHARGE MEDICATIONS:  New Prescriptions    No medications on file       (Please note that portions of this note were completed with a voice recognition program.  Efforts were made to edit the dictations but occasionally words are mis-transcribed.)    MD Ho Velez MD  10/29/19 1007

## 2019-10-29 NOTE — FLOWSHEET NOTE
[address] THERAPY MISSED TREATMENT NOTE  [facility] ICU 4D  4D-03/003-A      Date: 10/29/2019  Patient Name: Jesus Bryant        CSN: [id number]   : 1940  (67 y.o.)  Gender: female                REASON FOR MISSED TREATMENT: Pt on strict bedrest & awaiting neuro consult. Will check back 10/30/19.

## 2019-10-29 NOTE — PROGRESS NOTES
[address] ICU 4D  Bedside Swallowing Evaluation      SLP Individual Minutes  Time In: 0493  Time Out: 8448  Minutes: 11  Timed Code Treatment Minutes: 0 Minutes       Date: 10/29/2019  Patient Name: Ellen Menjivar      CSN: [id number]   : 1940  (67 y.o.)  Gender: female   Referring Physician:  Oly Gonzalez PA-C  Diagnosis: Subarachnoid bleed   Secondary Diagnosis: Dysphagia     History of Present Illness/Injury: Patient admitted to [facility] with above dx. See physician H&P for full report. ST consulted to complete swallow evaluation and cognitive evaluation to determine safety of PO intake and further evaluate cognitive function to safely return to home setting upon discharge. Past Medical History:   Diagnosis Date    Hypertension        SUBJECTIVE:  Patient seen at bedside, alert and pleasant requesting PO intake. No family present. RN JADA BAER [facility] approved BSE. OBJECTIVE:    Pain:  No pain reported. Current Diet: NPO -pending BSE    Respiratory Status:  Independent    Behavioral Observation:  Alert and Oriented    Oral Mechanism Evaluation:      Facial / Labial WFL    Lingual WFL    Dentition WFL    Velum WFL    Vocal Quality WFL    Sensation WFL    Cough Not Tested      Patient Evaluated Using: Thin liquids via cup and straw, puree, hard solids     Oral Phase:  WFL    Pharyngeal Phase: WFL:  Pharyngeal phase appears WFL but cannot rule out pharyngeal phase deficits from a bedside swallowing evaluation alone. Signs and Symptoms of Laryngeal Penetration/Aspiration: No signs/symptoms of aspiration evident in this evaluation, but cannot rule out silent aspiration. Impresssions: Patient presents with swallowing function that is essentially [facility]. All lingual/labial/pharyngeal structures present to be intact and functioning approprietly. Patient completed PO trials of thin, puree, hard solids without s/s of aspiration.   Patient demonstrated with timely, effective mastication, bolus formation, timely swallow trigger, adequate laryngeal elevation, effective oral clearance, no s/s of aspiration with all consistencies, vocal quality dry and clear. Recommend regular diet with thin liquids with diet monitor 1-2x to ensure safety of PO intake. ST also to f/u to complete cognitive evaluation and determine goals and POC as appropriate. RECOMMENDATIONS/ASSESSMENT:   Modified Barium Swallow:  MBS is not indicated at this time. Will recommend as appropriate  Diet Recommendations:  Regular with thin liquids   Strategies:  Full Upright Position, Small Bite/Sip and Pulmonary Monitoring   Rehabilitation Potential: good    EDUCATION:  Learner: Patient  Education:  Reviewed results and recommendations of this evaluation, Reviewed diet and strategies, Reviewed signs, symptoms and risks of aspiration, Reviewed ST goals and Plan of Care and Reviewed recommendations for follow-up  Evaluation of Education: Verbalizes understanding, Demonstrates with assistance and Family not present    PLAN:  Speech Therapy evaluation to assess speech, language, cognition and/or voice and Skilled SLP intervention on acute care 3-5 x per week or until goals met and/or pt plateaus in function. Specific interventions for next session may include: diet monitor 1-2x. PATIENT GOAL:    Did not state. Will further assess during treatment. SHORT TERM GOALS:  Short-term Goals  Timeframe for Short-term Goals: 2 weeks  Goal 1: Patient will tolerate regular diet with thin liquids without s/s of aspiration in order to safely maintain adequate hydration and nutrition  Goal 2: Complete cognitive evaluation and determine goals as appropriate.        LONG TERM GOALS:  No LTGs due to short [address] [place] [address] [place]

## 2019-10-29 NOTE — CARE COORDINATION
10/29/19, 8:58 AM      Kelsi Salter       Admitted from: ED 10/29/2019/ [facility] day: 0   Location: 4D-03/003-A Reason for admit: Subarachnoid bleed ([address] [I60.9] Status: IP  Admit order signed?: no  PMH:  has a past medical history of Hypertension. Procedure: none  Pertinent abnormal Imaging:  10/28 CT Head interpretation from [place]:  Mild subarachnoid or subdural hemorrhage along the anterior falx, more pronounced on the left as discussed above; No acute ischemic infarct or mass effect  10/29 CT Head: Stable small bilateral frontal region parafalcine subdural versus subarachnoid hemorrhage, larger on the left; Possible small amount of subdural hemorrhage along the posterior falx near the level of the torcula measuring 3.5 mm, unchanged compared to the prior study; Stable appearance of the brain compared to the previous study as detailed above   Medications:  Scheduled Meds:   sodium chloride flush  10 mL Intravenous 2 times per day    docusate sodium  100 mg Oral BID    famotidine  20 mg Oral BID    lisinopril  20 mg Oral Daily    And    hydrochlorothiazide  12.5 mg Oral Daily    potassium replacement protocol   Other RX Placeholder    potassium chloride  10 mEq Intravenous Q1H     Continuous Infusions:   sodium chloride 100 mL/hr at 10/29/19 0330      Pertinent Info/Orders/Treatment Plan: Presented to [facility] with AMS with slowed completion of tasks and speaking. Has had multiple falls in the last week. Reports dizziness when laying down, but this is not new for her. CT at [facility] revealed SAH, SDH, C5 fracture, T8 fracture, and old T12 & L1 fractures. Placed in [address] [place]. K+ 2.8 - given 40 mEq K+ and transferred to [facility] ED. Neurosurgery, Physiatry, and Intensivist consulted. Afebrile. NSR. On room air. Ox3, not time. Follows commands. NIH 3 with deficits noted with language, RUE, and RLE. SLP/PT/OT. Telemetry, IS, I&O, daily weight, neuro checks, SCDs, external urinary catheter care.

## 2019-10-30 PROBLEM — F17.200 TOBACCO DEPENDENCE: Status: ACTIVE | Noted: 2019-01-02

## 2019-10-30 LAB
AVERAGE GLUCOSE: 150 MG/DL (ref 70–126)
FOLATE: 11.1 NG/ML (ref 4.8–24.2)
GLUCOSE BLD-MCNC: 177 MG/DL (ref 70–108)
HBA1C MFR BLD: 7 % (ref 4.4–6.4)
ORGANISM: ABNORMAL
TSH SERPL DL<=0.05 MIU/L-ACNC: 3.25 UIU/ML (ref 0.4–4.2)
URINE CULTURE, ROUTINE: ABNORMAL
VITAMIN B-12: 482 PG/ML (ref 211–911)

## 2019-10-30 PROCEDURE — 97535 SELF CARE MNGMENT TRAINING: CPT

## 2019-10-30 PROCEDURE — 2709999900 HC NON-CHARGEABLE SUPPLY

## 2019-10-30 PROCEDURE — 84443 ASSAY THYROID STIM HORMONE: CPT

## 2019-10-30 PROCEDURE — 6370000000 HC RX 637 (ALT 250 FOR IP): Performed by: INTERNAL MEDICINE

## 2019-10-30 PROCEDURE — 82607 VITAMIN B-12: CPT

## 2019-10-30 PROCEDURE — 82948 REAGENT STRIP/BLOOD GLUCOSE: CPT

## 2019-10-30 PROCEDURE — 36415 COLL VENOUS BLD VENIPUNCTURE: CPT

## 2019-10-30 PROCEDURE — 92523 SPEECH SOUND LANG COMPREHEN: CPT

## 2019-10-30 PROCEDURE — 83036 HEMOGLOBIN GLYCOSYLATED A1C: CPT

## 2019-10-30 PROCEDURE — APPSS45 APP SPLIT SHARED TIME 31-45 MINUTES: Performed by: PHYSICIAN ASSISTANT

## 2019-10-30 PROCEDURE — 2060000000 HC ICU INTERMEDIATE R&B

## 2019-10-30 PROCEDURE — 2580000003 HC RX 258: Performed by: PHYSICIAN ASSISTANT

## 2019-10-30 PROCEDURE — 99232 SBSQ HOSP IP/OBS MODERATE 35: CPT | Performed by: INTERNAL MEDICINE

## 2019-10-30 PROCEDURE — 6370000000 HC RX 637 (ALT 250 FOR IP): Performed by: PHYSICIAN ASSISTANT

## 2019-10-30 PROCEDURE — 82746 ASSAY OF FOLIC ACID SERUM: CPT

## 2019-10-30 PROCEDURE — 6360000002 HC RX W HCPCS: Performed by: PHYSICIAN ASSISTANT

## 2019-10-30 PROCEDURE — 97166 OT EVAL MOD COMPLEX 45 MIN: CPT

## 2019-10-30 PROCEDURE — 97162 PT EVAL MOD COMPLEX 30 MIN: CPT

## 2019-10-30 PROCEDURE — 6370000000 HC RX 637 (ALT 250 FOR IP): Performed by: SURGERY

## 2019-10-30 RX ORDER — DEXTROSE MONOHYDRATE 25 G/50ML
12.5 INJECTION, SOLUTION INTRAVENOUS PRN
Status: DISCONTINUED | OUTPATIENT
Start: 2019-10-30 | End: 2019-11-01 | Stop reason: HOSPADM

## 2019-10-30 RX ORDER — NICOTINE 21 MG/24HR
1 PATCH, TRANSDERMAL 24 HOURS TRANSDERMAL DAILY PRN
Status: DISCONTINUED | OUTPATIENT
Start: 2019-10-30 | End: 2019-11-01 | Stop reason: HOSPADM

## 2019-10-30 RX ORDER — DEXTROSE MONOHYDRATE 50 MG/ML
100 INJECTION, SOLUTION INTRAVENOUS PRN
Status: DISCONTINUED | OUTPATIENT
Start: 2019-10-30 | End: 2019-11-01 | Stop reason: HOSPADM

## 2019-10-30 RX ORDER — NICOTINE POLACRILEX 4 MG
15 LOZENGE BUCCAL PRN
Status: DISCONTINUED | OUTPATIENT
Start: 2019-10-30 | End: 2019-11-01 | Stop reason: HOSPADM

## 2019-10-30 RX ADMIN — INSULIN LISPRO 1 UNITS: 100 INJECTION, SOLUTION INTRAVENOUS; SUBCUTANEOUS at 20:35

## 2019-10-30 RX ADMIN — LISINOPRIL 20 MG: 20 TABLET ORAL at 08:12

## 2019-10-30 RX ADMIN — HYDROCHLOROTHIAZIDE 12.5 MG: 12.5 CAPSULE ORAL at 08:13

## 2019-10-30 RX ADMIN — DOCUSATE SODIUM 100 MG: 100 CAPSULE, LIQUID FILLED ORAL at 20:32

## 2019-10-30 RX ADMIN — FAMOTIDINE 20 MG: 20 TABLET ORAL at 20:50

## 2019-10-30 RX ADMIN — DOCUSATE SODIUM 100 MG: 100 CAPSULE, LIQUID FILLED ORAL at 08:12

## 2019-10-30 RX ADMIN — SODIUM CHLORIDE: 9 INJECTION, SOLUTION INTRAVENOUS at 14:52

## 2019-10-30 RX ADMIN — FAMOTIDINE 20 MG: 20 TABLET ORAL at 08:12

## 2019-10-30 RX ADMIN — HYDRALAZINE HYDROCHLORIDE 5 MG: 20 INJECTION INTRAMUSCULAR; INTRAVENOUS at 23:50

## 2019-10-30 ASSESSMENT — PAIN SCALES - GENERAL
PAINLEVEL_OUTOF10: 0

## 2019-10-30 ASSESSMENT — PAIN SCALES - WONG BAKER
WONGBAKER_NUMERICALRESPONSE: 0

## 2019-10-30 NOTE — CONSULTS
[address] [place], [place]                                          NEUROSURGICAL CONSULTATION NOTE       Phylicia Olivas   YOB: 1940  Account Number: [de-identified]   Medical Record Number: [id number]  Contact Serial Number: [id number]  Date of Examination: 10/29/2019    ASSESSMENT:  Traumatic SAH; mild T8 compression fracture, likely subacute as patient denies back pain and has no tenderness over midback; old T12 & L1 compression fractures; degenerative cervical degenerative changes without neck tenderness    PLAN:  Repeat CT head stable. Cervical collar discontinued. Ok to be up with help. No back brace needed. Will follow. HISTORY OF PRESENT ILLNESS:  Phylicia Olivas is a 67 y.o. female, admitted on :10/29/2019  1:10 AM  Patient has had multiple falls. Denies loss of consciousness. Found to have subarachnoid hemorrhage on outside CT scan, as well as abnormal spine CT scans as well. PROBLEM LIST:  Patient Active Problem List   Diagnosis    Periorbital ecchymosis of left eye    Closed compression fracture of thoracic vertebra (HCC)    Closed compression fracture of first lumbar vertebra (HCC)    Dependence on nicotine from cigarettes    Subarachnoid bleed (HCC)    Closed head injury    Subdural bleeding (HCC)    Compression fracture of T8 vertebra (HCC)    Falls       MEDICATIONS:   Prior to Admission medications    Medication Sig Start Date End Date Taking?  Authorizing Provider   aspirin 81 MG tablet Take 1 tablet by mouth daily 1/7/19   KENYATTA Fletcher - CNP   alendronate (FOSAMAX) 70 MG tablet Take 70 mg by mouth every 7 days    Historical Provider, MD   lisinopril-hydrochlorothiazide (PRINZIDE;ZESTORETIC) 20-12.5 MG per tablet Take 1 tablet by mouth daily    Historical Provider, MD       Current Facility-Administered Medications   Medication Dose Route Frequency Provider Last Rate Last Dose    sodium chloride flush 0.9 % injection 10 mL  10 mL Intravenous 2 times per day Calib Lopez, PA-C   10 mL at 10/29/19 2147    sodium chloride flush 0.9 % injection 10 mL  10 mL Intravenous PRN Calib Lopez, PA-C        magnesium hydroxide (MILK OF MAGNESIA) 400 MG/5ML suspension 30 mL  30 mL Oral Daily PRN Calib Lopez, PA-C        ondansetron (ZOFRAN) injection 4 mg  4 mg Intravenous Q6H PRN Calib Lopez, PA-C        docusate sodium (COLACE) capsule 100 mg  100 mg Oral BID Calib Lopez, PA-C   100 mg at 10/30/19 9177    famotidine (PEPCID) tablet 20 mg  20 mg Oral BID Calib Lopez, PA-C   20 mg at 10/30/19 5087    0.9 % sodium chloride infusion   Intravenous Continuous Calib Lopez, PA-C 100 mL/hr at 10/29/19 1613      morphine (PF) injection 2 mg  2 mg Intravenous Q2H PRN Nirali Cummings MD        Or   Wu Gomes morphine injection 4 mg  4 mg Intravenous Q2H PRN Nirali Cummings MD        lisinopril (PRINIVIL;ZESTRIL) tablet 20 mg  20 mg Oral Daily Nirali Cummings MD   20 mg at 10/30/19 9252    And    hydrochlorothiazide (MICROZIDE) capsule 12.5 mg  12.5 mg Oral Daily Nirali Cummings MD   12.5 mg at 10/30/19 0813    potassium replacement protocol   Other RX Placeholder [place], [place]        hydrALAZINE (APRESOLINE) injection 5 mg  5 mg Intravenous Q6H PRN Oxly, PA        traMADol (ULTRAM) tablet 50 mg  50 mg Oral Q6H PRN [place], [place]        Or    traMADol (ULTRAM) tablet 100 mg  100 mg Oral Q6H PRN Oxly, PA              sodium chloride flush 10 mL PRN   magnesium hydroxide 30 mL Daily PRN   ondansetron 4 mg Q6H PRN   morphine 2 mg Q2H PRN   Or     morphine 4 mg Q2H PRN   hydrALAZINE 5 mg Q6H PRN   traMADol 50 mg Q6H PRN   Or     traMADol 100 mg Q6H PRN        sodium chloride 100 mL/hr at 10/29/19 1613         ALLERGIES:   Tylenol [acetaminophen]    PAST MEDICAL  HISTORY:    has a past medical history of Hypertension. PAST SURGICAL  HISTORY:    has a past surgical history that includes Abdomen surgery.     SOCIAL

## 2019-10-30 NOTE — PROGRESS NOTES
[facility] [address], [place]  NEUROSURGERY PROGRESS NOTE    Thena Lesch   YOB: 1940  Account Number: [de-identified]   Date of Examination: 10/30/2019      ASSESSMENT:  Traumatic stable SAH, mild T8 compression fx, old T12& L1 compression fx, cervical DD changes    PLAN:    No surgical intervention indicated  Head CT Stable   Up as tolerated/with assist   No back brace   Follow up in office in month  C-spine and lumbar spine xray in one month   Head CT in one month   Stable from neurosurgery standpoint   Neurosurgery signing off       Thena Lesch is a 67 y.o. female, admitted on :10/29/2019  1:10 AM    SUBJECTIVE/OBJECTIVE: Patient is awake and oriented to person. Denies headache. Denies back pain. Complains of neck discomfort. PERRL. Patient has antigravity strength in all fours. No need for a brace. Patient to be up as tolerated with assist. Plan discussed in RN     VITALS:    Vitals:    10/30/19 0341 10/30/19 0730 10/30/19 1030 10/30/19 1100   BP: (!) 150/82 (!) 173/85 (!) 161/74 (!) 158/82   Pulse: 74 81 88    Resp: 18 16 16    Temp: 97.8 °F (36.6 °C) 97.7 °F (36.5 °C) 97.9 °F (36.6 °C)    TempSrc: Oral Oral Oral    SpO2: 95%      Weight:       Height:             RADIOLOGY:  Pertinent images have been reviewed.   CBC:   Lab Results   Component Value Date    WBC 10.2 10/29/2019    HGB 13.8 10/29/2019    HCT 41.8 10/29/2019    MCV 90.1 10/29/2019     10/29/2019     BMP:   Lab Results   Component Value Date     10/29/2019    K 3.9 10/29/2019    K 3.1 10/29/2019     10/29/2019    CO2 25 10/29/2019    BUN 11 10/29/2019    CREATININE 0.5 10/29/2019    CALCIUM 9.3 10/29/2019     PT/INR: No results found for: PROTIME, INR  APTT: No results found for: APTT  Lipids:   Lab Results   Component Value Date    ALKPHOS 92 10/29/2019    ALT 34 10/29/2019    AST 33 10/29/2019    BILITOT 0.7 10/29/2019    LABALBU 3.6 10/29/2019     Troponin: No results found for: Arelis Duncan PROBLEM LIST:  Patient Active Problem List   Diagnosis    Periorbital ecchymosis of left eye    Closed compression fracture of thoracic vertebra (HCC)    Closed compression fracture of first lumbar vertebra (HCC)    Dependence on nicotine from cigarettes    Subarachnoid bleed (HCC)    Closed head injury    Subdural bleeding (HCC)    Compression fracture of T8 vertebra (HCC)    Falls       MEDICATIONS:   Prior to Admission medications    Medication Sig Start Date End Date Taking?  Authorizing Provider   aspirin 81 MG tablet Take 1 tablet by mouth daily 1/7/19   KENYATTA Guzman - CNP   alendronate (FOSAMAX) 70 MG tablet Take 70 mg by mouth every 7 days    Historical Provider, MD   lisinopril-hydrochlorothiazide (PRINZIDE;ZESTORETIC) 20-12.5 MG per tablet Take 1 tablet by mouth daily    Historical Provider, MD       Current Facility-Administered Medications   Medication Dose Route Frequency Provider Last Rate Last Dose    sodium chloride flush 0.9 % injection 10 mL  10 mL Intravenous 2 times per day Calib Lopez, PA-C   10 mL at 10/29/19 2147    sodium chloride flush 0.9 % injection 10 mL  10 mL Intravenous PRN Calib Lopez, PA-C        magnesium hydroxide (MILK OF MAGNESIA) 400 MG/5ML suspension 30 mL  30 mL Oral Daily PRN Calib Lopez, PA-C        ondansetron (ZOFRAN) injection 4 mg  4 mg Intravenous Q6H PRN Calib Lopez, PA-C        docusate sodium (COLACE) capsule 100 mg  100 mg Oral BID Calib Lopez, PA-C   100 mg at 10/30/19 0629    famotidine (PEPCID) tablet 20 mg  20 mg Oral BID Calib Lopez, PA-C   20 mg at 10/30/19 5622    0.9 % sodium chloride infusion   Intravenous Continuous Calib Lopez, PA-C 100 mL/hr at 10/29/19 1613      morphine (PF) injection 2 mg  2 mg Intravenous Q2H PRN Dorota Rashid MD        Or    morphine injection 4 mg  4 mg Intravenous Q2H PRN Dorota Rashid MD        lisinopril (PRINIVIL;ZESTRIL) tablet 20 mg  20 mg Oral Daily Dorota Rashid MD   20 mg at 10/30/19 2688    And

## 2019-10-30 NOTE — PROGRESS NOTES
Assessment complete. Unable to assess all ROM due to patient being unable to understand commands requested of her. Patient was alert and oriented to place and person, but disoriented to situation and time. Breath sound clear and equal bilaterally. Patient states she is comfortable and wishes to rest some more at this time.

## 2019-10-30 NOTE — DISCHARGE INSTR - COC
Continuity of Care Form    Patient Name: Aníbal Owen   :  1940  MRN:  [id number]    Admit date:  10/29/2019  Discharge date:  2019    Code Status Order: Full Code   Advance Directives:     Admitting Physician:  Charles Walsh MD  PCP: Young Tim MD    Discharging Nurse: [facility] Unit/Room#: 4A-13/12-A  Discharging Unit Phone Number:     Emergency Contact:   Extended Emergency Contact Information  Primary Emergency Contact: Maylon Severin [facility] [address] Phone: [phone]  Relation: Child  Secondary Emergency Contact: Saloni Gonzalez [facility] [address] Phone: [phone]  Relation: Brother/Sister    Past Surgical History:  Past Surgical History:   Procedure Laterality Date    ABDOMEN SURGERY         Immunization History: There is no immunization history on file for this patient. Active Problems:  Patient Active Problem List   Diagnosis Code    Periorbital ecchymosis of left eye S00. 12XA    Closed compression fracture of thoracic vertebra (HCC) S22.000A    Closed compression fracture of first lumbar vertebra ([facility]) S32.010A    Dependence on nicotine from cigarettes F17.210    Subarachnoid bleed ([facility]) I60.9    Closed head injury S09.90XA    Subdural bleeding ([facility]) I62.00    Compression fracture of T8 vertebra ([facility]) S22.060A    Falls W19. Sachin Clutter       Isolation/Infection:   Isolation          No Isolation            Nurse Assessment:  Last Vital Signs: BP (!) 158/74   Pulse 98   Temp 97.9 °F (36.6 °C) (Oral)   Resp 15   Ht 5' 5\" (1.651 m)   Wt 134 lb 7.7 oz (61 kg)   SpO2 95%   BMI 22.38 kg/m²     Last documented pain score (0-10 scale): Pain Level: 0  Last Weight:   Wt Readings from Last 1 Encounters:   10/29/19 134 lb 7.7 oz (61 kg)     Mental Status:  disoriented    IV Access:  - None    Nursing Mobility/ADLs:  Walking   Assisted  Transfer  Dependent  Bathing  Assisted  Dressing  Dependent  Toileting

## 2019-10-30 NOTE — PROGRESS NOTES
This student nurse entered room to perform vital signs and assessment. Pt eating in chair. VSS. Pt oriented to person, situation, and place. Disoriented to time. PAUL dorsiflexion and plantar flexion. Pt stated she feels tingling in her arms. PAUL sensation in LE. Scattered bruising present on body. Pt states she is not currently in pain. Nutrition entered room to ask for dinner preferences. Pt responses delayed but appropriate. All safety precautions followed. Will continue to monitor.

## 2019-10-30 NOTE — PROGRESS NOTES
[address] [address]  INPATIENT PHYSICAL THERAPY  EVALUATION  [facility] 4A - 4A-17/017-A    Time In: 1401  Time Out: 1413  Timed Code Treatment Minutes: 0 Minutes  Minutes: 12          Date: 10/30/2019  Patient Name: Germaine Vera,  Gender:  female        MRN: [id number]  : 1940  (75 y.o.)      Referring Practitioner: Alley Flannery PA-C  Diagnosis: Subarachnoid bleed  Additional Pertinent Hx: Germaine Vera is a 67year old white female smoker with PMH HTN who presented to [facility] on 10/28/19 via son for multiple falls in the last week and son noted altered mental status with slowed completion of tasks and speaking. At [facility] patient's CT showed subarachnoid hemorrhage, subdural hematoma, C5 fracture, T8 fracture and old T12 and L1 fractures. Patient placed in C-collar. Also found to have potassium of 2.8, given 40mEq K and transferred to [facility]. Patient admitted to [facility] ICU. Potassium found to be 3.1, replacement given. Patient has dizziness when laying, but she states this is not new for her. Denies CP, SOB, abdominal pain, HA, changes in vision. Per neurosurgery, mild T8 compression fracture, likely subacute as patient denies back pain and has no tenderness over midback; old T12 & L1 compression fractures; degenerative cervical degenerative changes without neck tenderness, ccollar DC'd, ok for activity as tolerated. Restrictions/Precautions:  Restrictions/Precautions: Fall Risk  Position Activity Restriction  Spinal Precautions: No Bending, No Lifting, No Twisting  Other position/activity restrictions: hx of dementia; mild T8 compression fracture, old T12 & L1 compression fractures    Subjective:  Chart Reviewed: Yes  Patient assessed for rehabilitation services?: Yes  Family / Caregiver Present: No  Subjective: RN approved session, ok for activity as tolerated, pt up in the chair attempting to eat. Neck brace DC'd per neurosurg.   Pt oriented to person, place and situation, slow awareness  Assessment: Pt tolerates session fair, limited by cognition with h/o dementia. Pt slow to respond, does not follow commands for LE AROM, becomes agitated upon standing. PT to continue to progress strength and functional mobility. Prognosis: Good    REQUIRES PT FOLLOW UP: Yes    Discharge Recommendations:  Discharge Recommendations: [address]    Patient Education:  PT Education: PT Role    Equipment Recommendations:  Equipment Needed: No    Plan:  Times per week: 5-6 X N  Current Treatment Recommendations: Strengthening, Patient/Caregiver Education & Training, Balance Training, Functional Mobility Training, Transfer Training, Safety Education & Training    Goals:  Patient goals : plan for NH for rehab  Short term goals  Time Frame for Short term goals: by discharge  Short term goal 1: Pt to transfer supine <--> sit SBA to enable pt to get in/out of bed. Short term goal 2: Pt to transfer sit <--> stand CGA for increased functional mobility. Short term goal 3: PT to assess gait. Long term goals  Time Frame for Long term goals : NA due to short length of stay. Following session, patient left in safe position with all fall risk precautions in place.

## 2019-10-30 NOTE — CARE COORDINATION
DISCHARGE BARRIERS  10/30/19, 1:43 PM    Reason for Referral:Current with Pranay honeycutt, [facility]. Mental Status: Spoke with patients michael Hodges as patient has some confusion. Decision Making: Michael Hodges is helping with the decision making. Family/Social/Home Environment: Was living at home with her son Christine Hodges and his children. They are very supportive. Current Services: [facility] for aid services 2 hours a day 3 days a week through [facility]. PHIL left a message for [facility] CEDRIC Tejeda [phone]. Current Equipment:rolling walker, rollator walker, high toilet, tub bench  Payment Source:Medicare and medicaid  Concerns or Barriers to Discharge: None  Collabrative List of ECF/HH were provided:Not at this time. Family already had their ECF preference. Teach Back Method used with Monique's michael Hodges regarding care plan and discharge planning. Patient's michael Hodges verbalized understanding of the plan of care and contribute to goal setting. Anticipated Needs/Discharge Plan: Spoke with michael Hodges regarding discharge planning. He would like Eduard Marcelino to be discharged to The [address] [place] [address] for short term rehab. PHIL called and made referral to Reddy De La Vega in admissions at the [place]. Electronically signed by MARSHA Andres on 10/30/2019 at 1:43 PM     Update 3:33pm: Eduard Marcelino has been accepted to [facility]. PHIL called and left a message for michael Hodges to make him aware.

## 2019-10-30 NOTE — PROGRESS NOTES
[address] [place] [address]  Speech - Language - Cognitive Evaluation    SLP Individual Minutes  Time In: 6949  Time Out: 8217  Minutes: 21   Timed Code Treatment Minutes: 0 Minutes       Date: 10/30/2019  Patient Name: Kelsi Salter      CSN: [id number]   : 1940  (67 y.o.)  Gender: female   Referring Physician:  Kareem Richardson PA-C  Diagnosis: Subarachnoid bleed  Secondary Diagnosis: Cognitive impairments  Precautions: Fall risk  History of Present Illness/Injury: Patient admitted to [facility] with above dx. See physician H&P for full report. BSE completed 10/29 with recommendations to complete cognitive evaluation and determine goals and POC as appropriate. Past Medical History:   Diagnosis Date    Hypertension        Pain: No pain reported. Subjective:  Patient seen resting in bed upon arrival. Lethargic with difficulty remaining alert and agitated. No family present. Kendal Lopez PA-C in midway through session. SOCIAL HISTORY: Patient reports that she lives with her son and 2 grandchildren. Patient reports her son and grandchildren assist with multiple household responsibilities. ORAL MOTOR:  Please see OME completed with BSE 10/29 for details. SPEECH / VOICE:  Speech/Voice: Patient demonstrated with limited verbal output overall, no dysarthria or dysphonia noted. LANGUAGE:  Receptive:  1 Step Commands: 0/2  Simple Yes/No Questions: 2/2  Complex Yes/No Questions: 0/2  Identify Objects/Pictures: 3/3    Expressive:  Automatic Speech: WFL, counting 1-10  Confrontational Naming: 3/3  Sentence Formulation: Limited verbal output   Conversational Speech: Intact for basic wants and needs  Paraphasias: None noted  Repetition: 0/2 sentence level    COGNITION:  [place] Cognitive Assessment (MOCA) version 7.2 completed. Pt scored 3/30. Normal is greater than or equal to 26/30.   Orientation: 0/6  Immediate Recall: 1/5  Short-Term Recall: 0/5  Divergent Namin words in 1 minute, despite MAX cues from ST (target=11)  Problem Solvin/1  Reasonin/2 verbal  Insight: Poor  Attention: 0/11  Math Computation: 0/3  Executive Functionin/5    SWALLOWING:  BSE completed 10/29 recommending regular diet with thin liquids. RECOMMENDATIONS/ASSESSMENT:  DIAGNOSTIC IMPRESSIONS:  Patient presents with severe cognitive/linugstic deficits as evidenced by the findings above. Patient demonstrates with intact ability to identify objects and pictures, answer simple yes/no questions, and communicate very basic wants/needs (\"I want water\"). Patient demonstrated with impaired ability to complete basic orientation, recall/short term memory, problem solving, reasoning, insight to defciits, attention, math computation, and executive functioning. Per chart review, within previous hospital stay at [facility] , patient completed MOCA 7.1 scoring . This hospital admission patient completed MOCA 7.2 scoring 3/30. Due to decline in cognitive performance and increasing difficulty to communicate ST services HIGHLY recommended to target cognitive deficits in order to return to home setting safely. At this time 24/hour supervision is HIGHLY recommended upon return to home setting. Rehabilitation Potential: good    EDUCATION:  Learner: Patient  Education:  Reviewed results and recommendations of this evaluation and Reviewed ST goals and Plan of Care  Evaluation of Education: Needs further instruction and Family not present    PLAN:  Skilled SLP intervention on acute care 3-5 x per week or until goals met and/or pt plateaus in function. Specific interventions for next session may include: cognitive tx. PATIENT GOAL:    Return to prior level of function.     SHORT TERM GOALS:  Short-term Goals  Timeframe for Short-term Goals: 2 weeks  Goal 1: Patient will tolerate regular diet with thin liquids without s/s of aspiration in order to safely maintain adequate

## 2019-10-30 NOTE — PROGRESS NOTES
Vitals stable besides slightly increased blood pressure of 161/74 on Automatic machine. Primary RN was notified. She advised me to take a manual blood pressure. Manual blood pressure was 158/82. Primary RN was notified. Patient is lying comfortably in bed with eyes closed.

## 2019-10-30 NOTE — PROGRESS NOTES
This RN asked patient if he would like to eat his lunch. He states no thank you at this time. This RN asks again and patient states no thank you and rolls over to face away from this RN.

## 2019-10-30 NOTE — PROGRESS NOTES
This measures 3.5 mm. There is no acute ischemic infarct, midline shift, mass, or mass effect. There is no intraparenchymal hemorrhage. Mild deep white matter small vessel chronic ischemic changes are noted. There is age appropriate cortical atrophy. Ventricles: The ventricles, cisterns and cortical sulci are enlarged concordant with the mild to moderate degree of age-appropriate atrophy. No obstructive hydrocephalus. There is no intraventricular hemorrhage. Skull base/calvarium/osseous structures: Unremarkable Soft tissues: Unremarkable Intraorbital contents: Unremarkable Sinuses: Unremarkable; the imaged sinuses are clear without evidence of mucosal thickening or fluid levels. Mastoids: Unremarkable; the mastoid air cells are clear. Stable small bilateral frontal region parafalcine subdural versus subarachnoid hemorrhage, larger on the left. Possible small amount of subdural hemorrhage along the posterior falx near the level of the torcula measuring 3.5 mm, unchanged compared to the prior study. Stable appearance of the brain compared to the previous study as detailed above. **This report has been created using voice recognition software. It may contain minor errors which are inherent in voice recognition technology. ** Final report electronically signed by Dr. Lc Artis on 10/29/2019 7:04 AM    Ct Interpretation Of Outside Images    Result Date: 10/29/2019  PROCEDURE: CT INTERPRETATION OF OUTSIDE IMAGES CLINICAL INFORMATION: interpretation . COMPARISON: No prior study. TECHNIQUE: Chest CT with contrast was performed at [facility] on 10/28/2019 at 8:58 PM and submitted for interpretation. FINDINGS: Lungs: There is no pneumonia or mass. There is bilateral lower lobe dependent atelectasis. The trachea and major bronchi are unremarkable. Pleura: No pleural effusion. No pneumothorax. Heart: Heart size is normal. There are coronary artery calcifications. There is no pericardial effusion.  Jade and mediastinum: There is no mass or adenopathy. Pulmonary vasculature: Unremarkable Thoracic aorta/vascular: No thoracic aortic aneurysm or dissection. The imaged portions of the brachiocephalic arteries are unremarkable. Imaged upper abdomen: See the accompanying abdomen pelvis CT report. Musculoskeletal system: There is bilateral glenohumeral joint DJD. The left scapular glenoid is shallow. There is an acute versus subacute mild to moderate T8 compression fracture. There are old moderate to severe T12 and L1 burst fractures with mild retropulsion into the spinal canal and mild spinal stenosis. Chest/body wall soft tissues: Unremarkable Thyroid: Unremarkable      Acute versus subacute mild to moderate T8 compression fracture. Old moderate to severe T12 and L1 burst fractures with mild spinal stenosis. Mild bilateral lower lobe dependent atelectasis. Coronary artery calcifications. **This report has been created using voice recognition software. It may contain minor errors which are inherent in voice recognition technology. ** Final report electronically signed by Dr. Moises Jenkins on 10/29/2019 4:16 AM    Ct Interpretation Of Outside Images    Result Date: 10/29/2019  PROCEDURE: CT INTERPRETATION OF OUTSIDE IMAGES CLINICAL INFORMATION: interpretation  . COMPARISON: None. TECHNIQUE: Abdomen pelvis CT with contrast was performed of the [facility] on 10/28/2019 at 9:02 PM and submitted for interpretation FINDINGS: Lower chest: There is mild bilateral lower lobe atelectasis. Liver: Unremarkable. There is no liver mass or intrahepatic biliary dilatation. Gallbladder/Biliary tree: Patient is status post cholecystectomy with clips in the gallbladder fossa. There is no biliary dilatation. Spleen: Unremarkable. No splenomegaly. Pancreas: Unremarkable. No mass or pancreatic ductal dilatation. No findings to suggest acute pancreatitis. Adrenal glands:  There is thickening of the limbs of the adrenal glands, more so on the in the pelvis. Masslike area in the central uterus which may represent a large fibroid although endometrial mass cannot be excluded. Recommend pelvic ultrasound correlation. Nonspecific bladder wall thickening. Probably acute versus subacute mild to moderate T8 compression fracture. Additional findings as detailed above. **This report has been created using voice recognition software. It may contain minor errors which are inherent in voice recognition technology. ** Final report electronically signed by Dr. Alanis Jaramillo on 10/29/2019 4:05 AM    Ct Interpretation Of Outside Images    Result Date: 10/29/2019  PROCEDURE: CT INTERPRETATION OF OUTSIDE IMAGES CLINICAL INFORMATION: transfer. COMPARISON: No prior study. TECHNIQUE: Thoracic spine CT was performed at [facility] on 10/28/2019 at 8:58 PM and submitted for interpretation. FINDINGS: There is an acute versus subacute mild to moderate T8 compression fracture. There are old moderate to severe T12 and L1 burst fractures with mild retropulsion into the spinal canal and mild spinal stenosis. There is no subluxation. There is multilevel mild midthoracic disc space narrowing consistent with degenerative disc disease. There is multilevel mild endplate spondylosis. At T7-8 there is a central disc protrusion with mild spinal stenosis and ventral thecal sac impingement. At T8-9 there is a central disc protrusion with mild spinal stenosis and ventral thecal sac impingement. At T11-12 there is mild spinal stenosis secondary to the T12 burst fracture due to retropulsion of the posterior cortex of T12 into the spinal canal. The paravertebral soft tissues are unremarkable. Acute versus subacute mild to moderate T8 compression fracture. Old moderate to severe at T12 and L1 burst fractures with mild retropulsion into the spinal canal and mild spinal stenosis. T7-8 and T8-9 central disc protrusion with mild spinal stenosis and ventral thecal sac impingement.  See the accompanying chest CT report for discussion of nonthoracic spine findings. **This report has been created using voice recognition software. It may contain minor errors which are inherent in voice recognition technology. ** Final report electronically signed by Dr. Gloria Marmolejo on 10/29/2019 3:13 AM    Ct Interpretation Of Outside Images    Result Date: 10/29/2019  PROCEDURE: CT INTERPRETATION OF OUTSIDE IMAGES CLINICAL INFORMATION: interpretation . COMPARISON: No prior study. TECHNIQUE: lumbar spine CT was performed at [facility] on 10/28/2019 at 8:59 PM and submitted for interpretation. All CT scans at this facility use dose modulation, iterative reconstruction, and/or weight-based dosing when appropriate to reduce radiation dose to as low as reasonably achievable. FINDINGS: There are 5  non-rib-bearing lumbar vertebrae. There is an age-indeterminate, probably acute or subacute mild to moderate compression fracture of T8. There are old appearing moderate to severe fractures of T12 and L1 with mild retropulsion into the spinal canal consistent with burst fractures. There  is very mild spinal stenosis at T12. There is no subluxation. There is moderate L5-S1 disc space narrowing consistent with degenerative disc disease. There is multilevel mid and lower lumbar facet joint DJD. The paravertebral soft tissues are unremarkable. The SI joints are unremarkable. At L1-2, there is no annular bulge, spinal stenosis, focal disc protrusion, or nerve root impingement. At L2-3, there is no annular bulge, spinal stenosis, focal disc protrusion, or nerve root impingement. At L3-4, there is an annular bulge with ligamentum flavum thickening and facet hypertrophic spurring with moderate spinal stenosis. No definite nerve root impingement. . At L4-5, there is a mild annular bulge without significant spinal stenosis. There is possible impingement upon the left L4 nerve root as it exits the foramen. ..  At L5-S1, there is no annular bulge, spinal stenosis, focal disc protrusion, or nerve root impingement. Probably acute or subacute mild to moderate T8 compression fracture. Old appearing moderate to severe T12 and L1 burst fractures with very mild T12 spinal stenosis. Moderate L5-S1 degenerative disc disease. Multilevel mid and lower lumbar facet joint DJD. L3-4 annular bulge with moderate spinal stenosis. L4-5 mild annular bulge with possible impingement on the left L4 nerve root. See the accompanying abdomen pelvis CT report for discussion of nonspine findings. **This report has been created using voice recognition software. It may contain minor errors which are inherent in voice recognition technology. ** Final report electronically signed by Dr. Everardo Can on 10/29/2019 2:59 AM    Ct Interpretation Of Outside Images    Result Date: 10/29/2019  PROCEDURE: CT INTERPRETATION OF OUTSIDE IMAGES CLINICAL INFORMATION: transfer. COMPARISON: Noncontrast brain CT performed at [facility] on 1/2/2019 TECHNIQUE: Noncontrast brain CT was performed at [facility] on 10/28/2019 at 8:48 PM and submitted for interpretation. FINDINGS: Brain: There is mild localized subarachnoid or subdural hemorrhage along the anterior falx, more pronounced on the left with a maximal thickness of 8 to 9 mm, measuring 2 to 3 mm on the right. There is no acute ischemic infarct, midline shift, mass, or mass effect. There is no focal abnormality of brain parenchymal attenuation. Mild deep white matter small vessel chronic ischemic changes are noted. There is age appropriate cortical atrophy. Ventricles: The ventricles, cisterns and cortical sulci are enlarged concordant with the mild to moderate degree of age-appropriate atrophy. No obstructive hydrocephalus.  Skull base/calvarium/osseous structures: Unremarkable Soft tissues: Unremarkable Intraorbital contents: Unremarkable Sinuses: Unremarkable; the imaged sinuses are clear without evidence of mucosal thickening or fluid levels. Mastoids: Unremarkable; the mastoid air cells are clear. Mild subarachnoid or subdural hemorrhage along the anterior falx, more pronounced on the left as discussed above. No acute ischemic infarct or mass effect. Aging changes as discussed above. **This report has been created using voice recognition software. It may contain minor errors which are inherent in voice recognition technology. ** Final report electronically signed by Dr. Sony Thurman on 10/29/2019 2:29 AM    Ct Interpretation Of Outside Images    Result Date: 10/29/2019  PROCEDURE: CT INTERPRETATION OF OUTSIDE IMAGES CLINICAL INFORMATION: transfer. COMPARISON: Cervical spine CT dated 1/2/2019 performed at [address] [place] [address] St: Noncontrast cervical spine CT was performed at [facility] on 10/28/2019 at 8:51 PM and submitted for interpretation. FINDINGS: Vertebrae: There is no acute fracture. There is stable 2 mm retrolisthesis of C2 on C3 and 2 mm retrolisthesis of C3 on C4. There is the suggestion of 3 mm anterolisthesis of C5 on C6 however the spinal laminar line is intact and the suggestion of anterolisthesis is probably be due to motion artifact which is readily apparent on the axial images through this region There is multilevel endplate spondylosis, uncovertebral joint DJD, and facet joint DJD. Disc spaces/neural foramina: There is moderate C2-3 and C3-4 disc space narrowing consistent with degenerative disc disease. At C2-3 and C3-4 there are mild disc osteophyte complexes with mild to moderate spinal stenosis, more severe at C3-4. There is ventral cord impingement and cord compression at C3-4. There is moderate right C3-4 neural foraminal stenosis. Spinal canal: There is no obvious epidural hematoma. Soft tissues: Prevertebral soft tissues are normal. Imaged lungs: The imaged lung apices are clear. Sinuses: The imaged sinuses are clear. . Mastoids: The imaged mastoid air cells are clear.  Stable      No

## 2019-10-30 NOTE — PROGRESS NOTES
This student nurse entered pt room. This student nurse fed pt dinner. Pt tolerated well, very lethargic. Vital signs obtained. VSS. Pt disoriented to time and situation. PAUL dorsiflexion and plantar flexion GCS 14. Scattered bruising present. IV assessment completed. Pt states no pain present. Rated 0/10 using FACES scale. All safety precautions followed. Will continue to monitor.

## 2019-10-30 NOTE — PROGRESS NOTES
deficits were noted. Repeat head CT yesterday morning was stable. Per neurosurgeon's note cervical collar was discontinued, patient is okay to be up with assistance, and no back brace is required. Neurosurgery to continue to follow. Patient with multiple bowel movements recorded. Patient remains stable from trauma perspective. Discharge pending clinical course, per case management note patient's son would like her to go to [address] [place] [address] at discharge. Wt Readings from Last 3 Encounters:   10/29/19 134 lb 7.7 oz (61 kg)   01/02/19 163 lb (73.9 kg)     Temp Readings from Last 3 Encounters:   10/30/19 97.7 °F (36.5 °C) (Oral)   01/04/19 97.8 °F (36.6 °C) (Oral)     BP Readings from Last 3 Encounters:   10/30/19 (!) 173/85   01/04/19 122/81     Pulse Readings from Last 3 Encounters:   10/30/19 81   01/04/19 94       24 HR INTAKE/OUTPUT :     Intake/Output Summary (Last 24 hours) at 10/30/2019 0827  Last data filed at 10/30/2019 0341  Gross per 24 hour   Intake 1444 ml   Output 750 ml   Net 694 ml     DIET LOW SODIUM 2 GM;    OBJECTIVE  CURRENT VITALS BP (!) 173/85   Pulse 81   Temp 97.7 °F (36.5 °C) (Oral)   Resp 16   Ht 5' 5\" (1.651 m)   Wt 134 lb 7.7 oz (61 kg)   SpO2 95%   BMI 22.38 kg/m²   GENERAL: alert, cooperative, no distress, confusion noted, laying comfortably in hospital bed. NEURO: Pupils equal and reactive to light, no focal neurological deficits noted on exam  LUNGS: clear to auscultation bilaterally- no wheezes, rales or rhonchi, normal air movement, no respiratory distress and no chest wall tenderness  HEART: normal rate, regular rhythm, normal S1 and S2, no murmurs, rubs, clicks or gallops, distal pulses intact  ABDOMEN: soft, non-tender, non-distended, normal bowel sounds, no masses or organomegaly  WOUNDS: no wounds noted  EXTREMITY: no cyanosis, clubbing or edema, PMS intact in all 4 extremities.       LABS  CBC :   Recent Labs     10/29/19  0327   WBC 10.2   HGB 13.8   HCT 41.8

## 2019-10-30 NOTE — PROGRESS NOTES
within discharge environment. Performance deficits / Impairments: Decreased functional mobility , Decreased cognition, Decreased endurance, Decreased ADL status, Decreased balance, Decreased safe awareness  Prognosis: Fair  REQUIRES OT FOLLOW UP: Yes  Decision Making: Medium Complexity  Safety Devices in place: Yes  Type of devices: All fall risk precautions in place, Gait belt, Call light within reach, Chair alarm in place, Nurse notified    Treatment Initiated: Treatment and education initiated within context of evaluation. Evaluation time included review of current medical information, gathering information related to past medical, social and functional history, completion of standardized testing, formal and informal observation of tasks, assessment of data and development of plan of care and goals. Treatment time included skilled education and facilitation of tasks to increase safety and independence with ADL's for improved functional independence and quality of life. Discharge Recommendations:  Subacute/Skilled Nursing Facility    Patient Education:  OT Education: OT Role, Plan of Care, ADL Adaptive Strategies, Transfer Training  Barriers to Learning: decreased cognition    Equipment Recommendations:   Other: Monitor pending progress    Plan:  Times per week: 6x  Current Treatment Recommendations: Balance Training, Self-Care / ADL, Functional Mobility Training, Endurance Training, Cognitive Reorientation, Safety Education & Training    Goals:  Patient goals : Pt did not state  Short term goals  Time Frame for Short term goals: 2 weeks  Short term goal 1: Tolerate standing 2 min with CGA for increased ease of toileting routine  Short term goal 2: Complete sit-stand from various surfaces including toilet with min A & min vcs for technique  Short term goal 3: Complete gentle BUE AROM exercises to increase UB AROM for grooming & UB dressing  Short term goal 4: Tolerate further assessment of functional

## 2019-10-31 LAB
ANION GAP SERPL CALCULATED.3IONS-SCNC: 17 MEQ/L (ref 8–16)
BACTERIA: ABNORMAL /HPF
BILIRUBIN URINE: NEGATIVE
BLOOD, URINE: ABNORMAL
BUN BLDV-MCNC: 8 MG/DL (ref 7–22)
CALCIUM SERPL-MCNC: 8.9 MG/DL (ref 8.5–10.5)
CASTS 2: ABNORMAL /LPF
CASTS UA: ABNORMAL /LPF
CHARACTER, URINE: ABNORMAL
CHLORIDE BLD-SCNC: 101 MEQ/L (ref 98–111)
CO2: 21 MEQ/L (ref 23–33)
COLOR: YELLOW
CREAT SERPL-MCNC: 0.5 MG/DL (ref 0.4–1.2)
CRYSTALS, UA: ABNORMAL
EPITHELIAL CELLS, UA: ABNORMAL /HPF
ERYTHROCYTE [DISTWIDTH] IN BLOOD BY AUTOMATED COUNT: 12.9 % (ref 11.5–14.5)
ERYTHROCYTE [DISTWIDTH] IN BLOOD BY AUTOMATED COUNT: 41.6 FL (ref 35–45)
GFR SERPL CREATININE-BSD FRML MDRD: > 90 ML/MIN/1.73M2
GLUCOSE BLD-MCNC: 113 MG/DL (ref 70–108)
GLUCOSE BLD-MCNC: 116 MG/DL (ref 70–108)
GLUCOSE BLD-MCNC: 122 MG/DL (ref 70–108)
GLUCOSE BLD-MCNC: 150 MG/DL (ref 70–108)
GLUCOSE BLD-MCNC: 186 MG/DL (ref 70–108)
GLUCOSE URINE: NEGATIVE MG/DL
HCT VFR BLD CALC: 41.3 % (ref 37–47)
HEMOGLOBIN: 14.1 GM/DL (ref 12–16)
KETONES, URINE: NEGATIVE
LEUKOCYTE ESTERASE, URINE: ABNORMAL
MAGNESIUM: 1.6 MG/DL (ref 1.6–2.4)
MCH RBC QN AUTO: 30.3 PG (ref 26–33)
MCHC RBC AUTO-ENTMCNC: 34.1 GM/DL (ref 32.2–35.5)
MCV RBC AUTO: 88.6 FL (ref 81–99)
MISCELLANEOUS 2: ABNORMAL
MRSA SCREEN: NORMAL
NITRITE, URINE: NEGATIVE
OSMOLALITY URINE: 450 MOSMOL/KG (ref 250–750)
PH UA: 7.5 (ref 5–9)
PLATELET # BLD: 246 THOU/MM3 (ref 130–400)
PMV BLD AUTO: 10.2 FL (ref 9.4–12.4)
POTASSIUM SERPL-SCNC: 2.7 MEQ/L (ref 3.5–5.2)
POTASSIUM SERPL-SCNC: 3.7 MEQ/L (ref 3.5–5.2)
POTASSIUM, URINE: 29.1 MEQ/L
PROTEIN UA: NEGATIVE
RBC # BLD: 4.66 MILL/MM3 (ref 4.2–5.4)
RBC URINE: ABNORMAL /HPF
RENAL EPITHELIAL, UA: ABNORMAL
SODIUM BLD-SCNC: 139 MEQ/L (ref 135–145)
SPECIFIC GRAVITY, URINE: 1.01 (ref 1–1.03)
UROBILINOGEN, URINE: 2 EU/DL (ref 0–1)
WBC # BLD: 10.5 THOU/MM3 (ref 4.8–10.8)
WBC UA: ABNORMAL /HPF
YEAST: ABNORMAL

## 2019-10-31 PROCEDURE — 94761 N-INVAS EAR/PLS OXIMETRY MLT: CPT

## 2019-10-31 PROCEDURE — 2580000003 HC RX 258: Performed by: INTERNAL MEDICINE

## 2019-10-31 PROCEDURE — 97535 SELF CARE MNGMENT TRAINING: CPT

## 2019-10-31 PROCEDURE — 51701 INSERT BLADDER CATHETER: CPT

## 2019-10-31 PROCEDURE — 2060000000 HC ICU INTERMEDIATE R&B

## 2019-10-31 PROCEDURE — 6360000002 HC RX W HCPCS: Performed by: PHYSICIAN ASSISTANT

## 2019-10-31 PROCEDURE — 36415 COLL VENOUS BLD VENIPUNCTURE: CPT

## 2019-10-31 PROCEDURE — APPSS45 APP SPLIT SHARED TIME 31-45 MINUTES: Performed by: PHYSICIAN ASSISTANT

## 2019-10-31 PROCEDURE — 81001 URINALYSIS AUTO W/SCOPE: CPT

## 2019-10-31 PROCEDURE — 84133 ASSAY OF URINE POTASSIUM: CPT

## 2019-10-31 PROCEDURE — 83735 ASSAY OF MAGNESIUM: CPT

## 2019-10-31 PROCEDURE — 6370000000 HC RX 637 (ALT 250 FOR IP): Performed by: INTERNAL MEDICINE

## 2019-10-31 PROCEDURE — 2709999900 HC NON-CHARGEABLE SUPPLY

## 2019-10-31 PROCEDURE — 83935 ASSAY OF URINE OSMOLALITY: CPT

## 2019-10-31 PROCEDURE — 97530 THERAPEUTIC ACTIVITIES: CPT

## 2019-10-31 PROCEDURE — 82948 REAGENT STRIP/BLOOD GLUCOSE: CPT

## 2019-10-31 PROCEDURE — 99232 SBSQ HOSP IP/OBS MODERATE 35: CPT | Performed by: INTERNAL MEDICINE

## 2019-10-31 PROCEDURE — 84132 ASSAY OF SERUM POTASSIUM: CPT

## 2019-10-31 PROCEDURE — 6360000002 HC RX W HCPCS: Performed by: INTERNAL MEDICINE

## 2019-10-31 PROCEDURE — 6370000000 HC RX 637 (ALT 250 FOR IP): Performed by: PHYSICIAN ASSISTANT

## 2019-10-31 PROCEDURE — 2580000003 HC RX 258: Performed by: PHYSICIAN ASSISTANT

## 2019-10-31 PROCEDURE — 85027 COMPLETE CBC AUTOMATED: CPT

## 2019-10-31 PROCEDURE — 80048 BASIC METABOLIC PNL TOTAL CA: CPT

## 2019-10-31 PROCEDURE — 6370000000 HC RX 637 (ALT 250 FOR IP): Performed by: SURGERY

## 2019-10-31 PROCEDURE — 6360000002 HC RX W HCPCS: Performed by: NURSE PRACTITIONER

## 2019-10-31 RX ORDER — POTASSIUM CHLORIDE 20 MEQ/1
20 TABLET, EXTENDED RELEASE ORAL 2 TIMES DAILY WITH MEALS
Status: DISCONTINUED | OUTPATIENT
Start: 2019-10-31 | End: 2019-11-01 | Stop reason: HOSPADM

## 2019-10-31 RX ORDER — MAGNESIUM SULFATE IN WATER 40 MG/ML
2 INJECTION, SOLUTION INTRAVENOUS ONCE
Status: COMPLETED | OUTPATIENT
Start: 2019-10-31 | End: 2019-10-31

## 2019-10-31 RX ORDER — HYDRALAZINE HYDROCHLORIDE 20 MG/ML
10 INJECTION INTRAMUSCULAR; INTRAVENOUS ONCE
Status: COMPLETED | OUTPATIENT
Start: 2019-10-31 | End: 2019-10-31

## 2019-10-31 RX ORDER — POTASSIUM CHLORIDE 7.45 MG/ML
10 INJECTION INTRAVENOUS
Status: COMPLETED | OUTPATIENT
Start: 2019-10-31 | End: 2019-10-31

## 2019-10-31 RX ADMIN — FAMOTIDINE 20 MG: 20 TABLET ORAL at 12:04

## 2019-10-31 RX ADMIN — POTASSIUM CHLORIDE 10 MEQ: 7.46 INJECTION, SOLUTION INTRAVENOUS at 08:48

## 2019-10-31 RX ADMIN — POTASSIUM CHLORIDE 10 MEQ: 7.46 INJECTION, SOLUTION INTRAVENOUS at 15:47

## 2019-10-31 RX ADMIN — SODIUM CHLORIDE: 9 INJECTION, SOLUTION INTRAVENOUS at 05:03

## 2019-10-31 RX ADMIN — POTASSIUM CHLORIDE 10 MEQ: 7.46 INJECTION, SOLUTION INTRAVENOUS at 13:26

## 2019-10-31 RX ADMIN — DOCUSATE SODIUM 100 MG: 100 CAPSULE, LIQUID FILLED ORAL at 20:12

## 2019-10-31 RX ADMIN — POTASSIUM CHLORIDE 20 MEQ: 1500 TABLET, EXTENDED RELEASE ORAL at 18:04

## 2019-10-31 RX ADMIN — LISINOPRIL 20 MG: 20 TABLET ORAL at 12:05

## 2019-10-31 RX ADMIN — INSULIN LISPRO 1 UNITS: 100 INJECTION, SOLUTION INTRAVENOUS; SUBCUTANEOUS at 20:12

## 2019-10-31 RX ADMIN — POTASSIUM CHLORIDE 10 MEQ: 7.46 INJECTION, SOLUTION INTRAVENOUS at 10:59

## 2019-10-31 RX ADMIN — POTASSIUM CHLORIDE 20 MEQ: 1500 TABLET, EXTENDED RELEASE ORAL at 12:04

## 2019-10-31 RX ADMIN — HYDROCHLOROTHIAZIDE 12.5 MG: 12.5 CAPSULE ORAL at 12:05

## 2019-10-31 RX ADMIN — POTASSIUM CHLORIDE 10 MEQ: 7.46 INJECTION, SOLUTION INTRAVENOUS at 05:04

## 2019-10-31 RX ADMIN — FAMOTIDINE 20 MG: 20 TABLET ORAL at 20:12

## 2019-10-31 RX ADMIN — HYDRALAZINE HYDROCHLORIDE 10 MG: 20 INJECTION INTRAMUSCULAR; INTRAVENOUS at 01:20

## 2019-10-31 RX ADMIN — Medication 10 ML: at 20:12

## 2019-10-31 RX ADMIN — MAGNESIUM SULFATE IN WATER 2 G: 40 INJECTION, SOLUTION INTRAVENOUS at 10:00

## 2019-10-31 RX ADMIN — POTASSIUM CHLORIDE 10 MEQ: 7.46 INJECTION, SOLUTION INTRAVENOUS at 09:44

## 2019-10-31 ASSESSMENT — PAIN SCALES - WONG BAKER
WONGBAKER_NUMERICALRESPONSE: 0
WONGBAKER_NUMERICALRESPONSE: 0

## 2019-10-31 ASSESSMENT — PAIN SCALES - GENERAL
PAINLEVEL_OUTOF10: 0

## 2019-10-31 NOTE — FLOWSHEET NOTE
10/31/19 0417   Provider Notification   Reason for Communication Critical Value (comment)  (K+ 2.7)   Provider Name Christo Jd   Provider Notification Nurse Practitioner   Method of Communication Secure Message   Response Waiting for response   Notification Time [id number]   Notified Christo Miles of patient's critical K+ of 2.7. Potassium replacement protocol is already ordered. Awaiting response.

## 2019-10-31 NOTE — PLAN OF CARE
Problem: Nutrition  Goal: Optimal nutrition therapy  Outcome: Ongoing   Nutrition Problem: Inadequate oral intake  Intervention: Food and/or Nutrient Delivery: Continue current diet, Start ONS  Nutritional Goals: Patient will consume 75% or more of meals during LOS.

## 2019-10-31 NOTE — PLAN OF CARE
Problem: Pain:  Goal: Pain level will decrease  Description  Pain level will decrease  Outcome: Ongoing  Note:   Patient able to use 0-10 pain scale. Denies pain at this time. Patient has a pain goal of \"no pain. \" Agreeable to take PRN pain medications. `     Problem: Falls - Risk of:  Goal: Will remain free from falls  Description  Will remain free from falls  Outcome: Ongoing  Note:   Call light in reach, bed in lowest position, and bed alarm activated. Education given on use of call light before ambulation and when in need of assistance. Patient expressed understanding. Hourly visual checks performed and charted. Toileting offered to patient. No falls this shift, at any time. Arm band and falling star in place. Will continue to monitor. Problem: Falls - Risk of:  Goal: Absence of physical injury  Description  Absence of physical injury  Outcome: Ongoing  Note:   Call light in reach, bed in lowest position, and bed alarm activated. Education given on use of call light before ambulation and when in need of assistance. Patient expressed understanding. Hourly visual checks performed and charted. Toileting offered to patient. No falls this shift, at any time. Arm band and falling star in place. Will continue to monitor. Problem: Risk for Impaired Skin Integrity  Goal: Tissue integrity - skin and mucous membranes  Description  Structural intactness and normal physiological function of skin and  mucous membranes. Outcome: Ongoing  Note:   No signs of skin breakdown. Skin warm, dry, and intact. Mucous membranes pink and moist.  Assistance with turns/ambulation provided every 2 hours. Will continue to monitor. Problem: ACTIVITY INTOLERANCE/IMPAIRED MOBILITY  Goal: Mobility/activity is maintained at optimum level for patient  Outcome: Ongoing  Note:   Patient was a hard two assist to get to the chair. Patient needed a [address] St[place] to return to bed. Will continue to work with therapies. Problem: COMMUNICATION IMPAIRMENT  Goal: Ability to express needs and understand communication  Outcome: Ongoing  Note:   Patient has some significant confusion at times. Per family this is closed to baseline. Will continue to monitor. Problem: DISCHARGE BARRIERS  Goal: Patient's continuum of care needs are met  10/30/2019 2252 by Erika Delgado RN  Outcome: Ongoing  Note:   Discharge planning in process and discussed with patient/family. Social work consulted for any additional needs. Care manager aware of discharge needs. Patient is from home with her son and plans to be discharged to [address] [place] [address]. Care plan reviewed with patient and family. Patient and family verbalize understanding of the plan of care and contribute to goal setting.

## 2019-10-31 NOTE — PROGRESS NOTES
increased time for completion. onto STS with kim lawrence RN present . BALANCE:  Sitting Balance:  Contact Guard Assistance, Minimal Assistance, with cues for safety, with verbal cues   Standing Balance: Moderate Assistance, X 2, with cues for safety, with verbal cues , with increased time for completion standing in kim stedy and with RN and RUANO standing in RW     BED MOBILITY:  Supine to Sit: Moderate Assistance, with head of bed raised, with verbal cues  for LE to EOB and trunk off bed     TRANSFERS:  Sit to Stand: Moderate Assistance, X 2, with increased time for completion, with verbal cues. from STS in RW and from EOB in [place] stedy with RN and RUANO   Stand to Sit: Moderate Assistance, X 2, with increased time for completion, with verbal cues. to STS and recliner     FUNCTIONAL MOBILITY:  Assistive Device: Rolling Walker and kim stedy   Assist Level: Moderate Assistance, X 2, with set-up, with verbal cues  and with increased time for completion. Distance: To and from bathroom  Did use kimtanesha lopezdallas from EOB to bathroom with RN and RUANO , then did RW from bathroom to recliner with RUANO and RN with extended time to complete and constant cues to takes steps and posture throughout    OTR To see pt on 11/1 for mobility goals to address. ASSESSMENT:     Activity Tolerance:  Patient tolerance of  treatment: fair. Extended time to complete       Discharge Recommendations: Subacute/Skilled Nursing Facility  Equipment Recommendations:  Other: Monitor pending progress  Plan: Times per week: 6x  Current Treatment Recommendations: Balance Training, Self-Care / ADL, Functional Mobility Training, Endurance Training, Cognitive Reorientation, Safety Education & Training    Patient Education  Patient Education: ADL's and Importance of Increasing Activity    Goals  Short term goals  Time Frame for Short term goals: 2 weeks  Short term goal 1: Tolerate standing 2 min with CGA for increased ease of toileting routine  Short term goal 2: Complete sit-stand from various surfaces including toilet with min A & min vcs for technique  Short term goal 3: Complete gentle BUE AROM exercises to increase UB AROM for grooming & UB dressing  Short term goal 4: Tolerate further assessment of functional ambulation by OTR when appropriate  Long term goals  Time Frame for Long term goals : No LTG set d/t short ELOS    Following session, patient left in safe position with all fall risk precautions in place.

## 2019-10-31 NOTE — PROGRESS NOTES
[facility]  PHYSICAL THERAPY MISSED TREATMENT NOTE  ACUTE CARE    Date: 10/31/2019  Patient Name: Petty Garcia        MRN: [id number]   : 1940  (67 y.o.)  Gender: female   Referring Practitioner: JOSE Lopez PA-C  Referring Practitioner: Kavya Sharif PA-C  Diagnosis: subaranoid bleed  Diagnosis: Subarachnoid bleed         REASON FOR MISSED TREATMENT:  Patient unable to participate. Pt snoring upon arrival to room. Pt awakened very briefly and fell right back to sleep. Pt not appropriate to attempt therapy at this time. Shiva Mckinney.  Pushpa Winkler, [place] [address]

## 2019-10-31 NOTE — PROGRESS NOTES
or edema, PMS intact in all 4 extremities. LABS  CBC :   Recent Labs     10/29/19  0327 10/31/19  0332   WBC 10.2 10.5   HGB 13.8 14.1   HCT 41.8 41.3   MCV 90.1 88.6    246     BMP:   Recent Labs     10/29/19  0327 10/29/19  1416 10/31/19  0332    141 139   K 3.1* 3.9 2.7*    104 101   CO2 24 25 21*   BUN 14 11 8   CREATININE 0.6 0.5 0.5     COAGS:   Recent Labs     10/29/19  0327   PROT 7.0     Pancreas/HFP:  No results for input(s): LIPASE, AMYLASE in the last 72 hours.   Recent Labs     10/29/19  0327   AST 33   ALT 34   BILITOT 0.7   ALKPHOS 92     RADIOLOGY  No new results       Electronically signed by Mackenzie Anne PA-C on 10/31/2019 at 8:42 AM

## 2019-10-31 NOTE — PROGRESS NOTES
This student nurse entered pt room. With help of Andria Student Nurse, pt depends was changed and grace care performed. Pt received docusate, colace, 1 unit of insulin, and a nicotine patch. Pt had trouble swallowing oral pills, needed applesauce and extra fluids to swallow. Coughing noted. Pt tolerated med well after coughing ended. Pt resting in bed. All safety precautions followed.

## 2019-10-31 NOTE — PROGRESS NOTES
Hospitalist Progress Note    Patient: Jeffery Lipscomb  YOB: 1940  MRN: [id number]   PCP: Dru Peabody, MD         Acct: [de-identified]  Unit/Bed: [address] [place] [address]    Date of Admission: 10/29/2019      ASSESSMENT     1. Traumatic subarachnoid hemorrhage:  [facility] CT head results: mild subarachnoid or subdural hemorrhage along anterior falx more pronounced on the left. No acute ischemic infarct or mass effect. Patient is oriented to person, place, president, season but not month or year. Equal strength in UE and LE. Hold ASA. Repeat CT this morning. Trauma/Neurosurgery following. 2. Spinal fractures:  Hayes CT Chest results: Acute vs subacute mild to moderate T8 compression fractures, old moderate to severe T12, L1 burst fractures with mild spinal stenosis. 3 mm anterolisthesis of C5 on C6 is probably artifactual as the spinal laminar line is intact and motion artifact is present. Patient has equal sensation and strength in LE and UE. Denies numbness tingling. Patient states has had some new incontinence the past week  C-collar in place. Neurosurgery  3. Acute metabolic encephalopathy: likely 2/2 subarachnoid hemorrhage vs ? Urine culture unremarkable with growth of contaminants. Patient's only symptom is polyuria and may be related to new diagnosis of DM Type 2, Vitamin B12, folate, TSH unremarkable. 4. New diagnosis of DM Type 2, HgbA1C 6.9  5. Hypokalemia: replace prn  6. Hypomagnesemia: replace prn  7. Essential Hypertension: per history. /84. Continue home lisinopril-HCTZ 20mg-12.5mg. May need to supplement potassium  8. Tobacco dependence: nicotine patch? PLAN     1. Started patient on metformin for DM Type 2. Dietitian tried to see and was not able to, patient not very cooperative today. 2. Will attempt to obtain another urine sample today, to see whether it is contributing to her lack of cooperation- underlying dementia? Patient also not eating much.  Had temperature of 99.1F overnight  3. Start on potassium/magnesium supplementation  4. OK with discharge tomorrow if patient stays stable    Anticipated Discharge in : TBD  Code Status: Full Code    Electronically signed by Katelynn Valdez MD on 10/31/2019 at 12:32 PM      Chief Complaint     Falls and AMS    SUBJECTIVE     The patient is a 67 y.o. female w/ a hx of HTN, tobacco dependence who was admitted as a transfer from [facility] on 10/29/2019 after she presented with multiple falls in the last week and sone noted altered mental status. [facility] patient's CT showed subarachnoid hemorrhage, subdural hematoma, C5 fracture, T8 fracture and old T12 and L1 fractures. Patient placed in C-collar. Also found to have potassium of 2.8, given 40mEq K and transferred to [facility]. Patient admitted to [facility] ICU. Potassium found to be 3.1, replacement given. 10/31:  Patient not very cooperative with examination today. She denies CP, SOB or pain anywhere and states that she would like to be left alone. K+ and Magnesium low this am. Patient also did not eat breakfast this morning. Had a midl temperature of 99.1F overnight.       OBJECTIVE     Medications:  Reviewed    Infusion Medications    dextrose      sodium chloride 40 mL/hr at 10/31/19 0503     Scheduled Medications    potassium chloride  10 mEq Intravenous Q2H    potassium chloride  20 mEq Oral BID     [START ON 11/1/2019] metFORMIN  500 mg Oral Daily with breakfast    insulin lispro  0-6 Units Subcutaneous TID     insulin lispro  0-3 Units Subcutaneous Nightly    sodium chloride flush  10 mL Intravenous 2 times per day    docusate sodium  100 mg Oral BID    famotidine  20 mg Oral BID    lisinopril  20 mg Oral Daily    And    hydrochlorothiazide  12.5 mg Oral Daily    potassium replacement protocol   Other RX Placeholder     PRN Meds: glucose, dextrose, glucagon (rDNA), dextrose, nicotine, sodium chloride flush, magnesium hydroxide, ondansetron, morphine **OR** follow up on [address], [facility]. COMPARISON: Noncontrast brain CT performed at [facility] on 10/28/2019. TECHNIQUE: Noncontrast 5 mm axial images were obtained through the brain. Sagittal and coronal reconstructions were obtained and reviewed. All CT scans at this facility use dose modulation, iterative reconstruction, and/or weight-based dosing when appropriate to reduce radiation dose to as low as reasonably achievable. FINDINGS: Brain: There is no change in the bilateral focal anterior frontal region parafalcine subdural versus subarachnoid hemorrhage, again slightly larger on the left measuring up to 9 to 10 mm and 3 mm on the right. There may also be a small amount of subdural  hemorrhage along the posterior falx in the occipital region adjacent to the torcula, stable in retrospect on the prior study. This measures 3.5 mm. There is no acute ischemic infarct, midline shift, mass, or mass effect. There is no intraparenchymal hemorrhage. Mild deep white matter small vessel chronic ischemic changes are noted. There is age appropriate cortical atrophy. Ventricles: The ventricles, cisterns and cortical sulci are enlarged concordant with the mild to moderate degree of age-appropriate atrophy. No obstructive hydrocephalus. There is no intraventricular hemorrhage. Skull base/calvarium/osseous structures: Unremarkable Soft tissues: Unremarkable Intraorbital contents: Unremarkable Sinuses: Unremarkable; the imaged sinuses are clear without evidence of mucosal thickening or fluid levels. Mastoids: Unremarkable; the mastoid air cells are clear. Stable small bilateral frontal region parafalcine subdural versus subarachnoid hemorrhage, larger on the left. Possible small amount of subdural hemorrhage along the posterior falx near the level of the torcula measuring 3.5 mm, unchanged compared to the prior study. Stable appearance of the brain compared to the previous study as detailed above.   **This report has been created using voice recognition software. It may contain minor errors which are inherent in voice recognition technology. ** Final report electronically signed by Dr. Alanis Jaramillo on 10/29/2019 7:04 AM    Ct Interpretation Of Outside Images    Result Date: 10/29/2019  PROCEDURE: CT INTERPRETATION OF OUTSIDE IMAGES CLINICAL INFORMATION: interpretation . COMPARISON: No prior study. TECHNIQUE: Chest CT with contrast was performed at [facility] on 10/28/2019 at 8:58 PM and submitted for interpretation. FINDINGS: Lungs: There is no pneumonia or mass. There is bilateral lower lobe dependent atelectasis. The trachea and major bronchi are unremarkable. Pleura: No pleural effusion. No pneumothorax. Heart: Heart size is normal. There are coronary artery calcifications. There is no pericardial effusion. Jade and mediastinum: There is no mass or adenopathy. Pulmonary vasculature: Unremarkable Thoracic aorta/vascular: No thoracic aortic aneurysm or dissection. The imaged portions of the brachiocephalic arteries are unremarkable. Imaged upper abdomen: See the accompanying abdomen pelvis CT report. Musculoskeletal system: There is bilateral glenohumeral joint DJD. The left scapular glenoid is shallow. There is an acute versus subacute mild to moderate T8 compression fracture. There are old moderate to severe T12 and L1 burst fractures with mild retropulsion into the spinal canal and mild spinal stenosis. Chest/body wall soft tissues: Unremarkable Thyroid: Unremarkable      Acute versus subacute mild to moderate T8 compression fracture. Old moderate to severe T12 and L1 burst fractures with mild spinal stenosis. Mild bilateral lower lobe dependent atelectasis. Coronary artery calcifications. **This report has been created using voice recognition software. It may contain minor errors which are inherent in voice recognition technology. ** Final report electronically signed by Dr. Alanis Jaramillo on 10/29/2019 4:16 AM    Ct suggestion of anterolisthesis is probably be due to motion artifact which is readily apparent on the axial images through this region There is multilevel endplate spondylosis, uncovertebral joint DJD, and facet joint DJD. Disc spaces/neural foramina: There is moderate C2-3 and C3-4 disc space narrowing consistent with degenerative disc disease. At C2-3 and C3-4 there are mild disc osteophyte complexes with mild to moderate spinal stenosis, more severe at C3-4. There is ventral cord impingement and cord compression at C3-4. There is moderate right C3-4 neural foraminal stenosis. Spinal canal: There is no obvious epidural hematoma. Soft tissues: Prevertebral soft tissues are normal. Imaged lungs: The imaged lung apices are clear. Sinuses: The imaged sinuses are clear. . Mastoids: The imaged mastoid air cells are clear. Stable      No acute fracture. Stable 2 mm retrolisthesis of C2 on C3 and C3 on C4. Suggestion of 3 mm anterolisthesis of C5 on C6 is probably artifactual as the spinal laminar line is intact and motion artifact is present as discussed above. If further evaluation is warranted, recommend repeat limited cervical spine CT from C4 through C7 versus obtaining routine cervical spine x-rays. Stable multilevel degenerative disc disease and DJD. Stable C2-3 and C3-4 disc osteophyte complexes with cord compression at C3-4. **This report has been created using voice recognition software. It may contain minor errors which are inherent in voice recognition technology. ** Final report electronically signed by Dr. Eevrardo Can on 10/29/2019 2:25 AM      DVT prophylaxis: ? Lovenox                                 ? SCDs                                 ? SQ Heparin                                 ? Encourage ambulation           ? Already on Anticoagulation     Disposition:    ? Home       ? TCU       ? Rehab       ? Psych       ? SNF       ? [place]       ?  Other-

## 2019-11-01 VITALS
BODY MASS INDEX: 24.86 KG/M2 | WEIGHT: 149.2 LBS | OXYGEN SATURATION: 94 % | HEART RATE: 107 BPM | TEMPERATURE: 98.3 F | HEIGHT: 65 IN | DIASTOLIC BLOOD PRESSURE: 68 MMHG | SYSTOLIC BLOOD PRESSURE: 135 MMHG | RESPIRATION RATE: 23 BRPM

## 2019-11-01 LAB
ANION GAP SERPL CALCULATED.3IONS-SCNC: 14 MEQ/L (ref 8–16)
BUN BLDV-MCNC: 11 MG/DL (ref 7–22)
CALCIUM SERPL-MCNC: 8.9 MG/DL (ref 8.5–10.5)
CHLORIDE BLD-SCNC: 103 MEQ/L (ref 98–111)
CO2: 21 MEQ/L (ref 23–33)
CREAT SERPL-MCNC: 0.5 MG/DL (ref 0.4–1.2)
GFR SERPL CREATININE-BSD FRML MDRD: > 90 ML/MIN/1.73M2
GLUCOSE BLD-MCNC: 115 MG/DL (ref 70–108)
GLUCOSE BLD-MCNC: 124 MG/DL (ref 70–108)
GLUCOSE BLD-MCNC: 143 MG/DL (ref 70–108)
GLUCOSE BLD-MCNC: 146 MG/DL (ref 70–108)
MAGNESIUM: 2 MG/DL (ref 1.6–2.4)
POTASSIUM SERPL-SCNC: 3.5 MEQ/L (ref 3.5–5.2)
SODIUM BLD-SCNC: 138 MEQ/L (ref 135–145)

## 2019-11-01 PROCEDURE — 6370000000 HC RX 637 (ALT 250 FOR IP): Performed by: INTERNAL MEDICINE

## 2019-11-01 PROCEDURE — 97530 THERAPEUTIC ACTIVITIES: CPT

## 2019-11-01 PROCEDURE — 80048 BASIC METABOLIC PNL TOTAL CA: CPT

## 2019-11-01 PROCEDURE — 97535 SELF CARE MNGMENT TRAINING: CPT

## 2019-11-01 PROCEDURE — 36415 COLL VENOUS BLD VENIPUNCTURE: CPT

## 2019-11-01 PROCEDURE — 2709999900 HC NON-CHARGEABLE SUPPLY

## 2019-11-01 PROCEDURE — 93005 ELECTROCARDIOGRAM TRACING: CPT | Performed by: INTERNAL MEDICINE

## 2019-11-01 PROCEDURE — 94760 N-INVAS EAR/PLS OXIMETRY 1: CPT

## 2019-11-01 PROCEDURE — APPSS60 APP SPLIT SHARED TIME 46-60 MINUTES: Performed by: NURSE PRACTITIONER

## 2019-11-01 PROCEDURE — 97110 THERAPEUTIC EXERCISES: CPT

## 2019-11-01 PROCEDURE — 6370000000 HC RX 637 (ALT 250 FOR IP): Performed by: SURGERY

## 2019-11-01 PROCEDURE — 99232 SBSQ HOSP IP/OBS MODERATE 35: CPT | Performed by: INTERNAL MEDICINE

## 2019-11-01 PROCEDURE — 82948 REAGENT STRIP/BLOOD GLUCOSE: CPT

## 2019-11-01 PROCEDURE — 2580000003 HC RX 258: Performed by: INTERNAL MEDICINE

## 2019-11-01 PROCEDURE — APPNB60 APP NON BILLABLE TIME 46-60 MINS: Performed by: NURSE PRACTITIONER

## 2019-11-01 PROCEDURE — 2580000003 HC RX 258: Performed by: PHYSICIAN ASSISTANT

## 2019-11-01 PROCEDURE — 6360000002 HC RX W HCPCS: Performed by: PHYSICIAN ASSISTANT

## 2019-11-01 PROCEDURE — 6370000000 HC RX 637 (ALT 250 FOR IP): Performed by: PHYSICIAN ASSISTANT

## 2019-11-01 PROCEDURE — 83735 ASSAY OF MAGNESIUM: CPT

## 2019-11-01 RX ORDER — 0.9 % SODIUM CHLORIDE 0.9 %
500 INTRAVENOUS SOLUTION INTRAVENOUS ONCE
Status: COMPLETED | OUTPATIENT
Start: 2019-11-01 | End: 2019-11-01

## 2019-11-01 RX ORDER — TRAMADOL HYDROCHLORIDE 50 MG/1
50 TABLET ORAL EVERY 6 HOURS PRN
Qty: 21 TABLET | Refills: 0 | Status: SHIPPED | DISCHARGE
Start: 2019-11-01 | End: 2019-11-08

## 2019-11-01 RX ORDER — PSEUDOEPHEDRINE HCL 30 MG
100 TABLET ORAL 2 TIMES DAILY PRN
Status: ON HOLD | DISCHARGE
Start: 2019-11-01 | End: 2019-12-06 | Stop reason: HOSPADM

## 2019-11-01 RX ORDER — NICOTINE 21 MG/24HR
1 PATCH, TRANSDERMAL 24 HOURS TRANSDERMAL DAILY PRN
Qty: 30 PATCH | Refills: 0 | DISCHARGE
Start: 2019-11-01

## 2019-11-01 RX ORDER — POTASSIUM CHLORIDE 20 MEQ/1
20 TABLET, EXTENDED RELEASE ORAL 2 TIMES DAILY WITH MEALS
Qty: 60 TABLET | Refills: 0 | Status: ON HOLD | DISCHARGE
Start: 2019-11-01 | End: 2019-12-06 | Stop reason: HOSPADM

## 2019-11-01 RX ADMIN — METOPROLOL TARTRATE 12.5 MG: 25 TABLET ORAL at 11:12

## 2019-11-01 RX ADMIN — Medication 10 ML: at 09:11

## 2019-11-01 RX ADMIN — DOCUSATE SODIUM 100 MG: 100 CAPSULE, LIQUID FILLED ORAL at 09:10

## 2019-11-01 RX ADMIN — HYDRALAZINE HYDROCHLORIDE 5 MG: 20 INJECTION INTRAMUSCULAR; INTRAVENOUS at 00:30

## 2019-11-01 RX ADMIN — METFORMIN HYDROCHLORIDE 500 MG: 500 TABLET ORAL at 09:10

## 2019-11-01 RX ADMIN — HYDROCHLOROTHIAZIDE 12.5 MG: 12.5 CAPSULE ORAL at 09:10

## 2019-11-01 RX ADMIN — POTASSIUM CHLORIDE 20 MEQ: 1500 TABLET, EXTENDED RELEASE ORAL at 09:10

## 2019-11-01 RX ADMIN — SODIUM CHLORIDE 500 ML: 9 INJECTION, SOLUTION INTRAVENOUS at 10:50

## 2019-11-01 RX ADMIN — LISINOPRIL 20 MG: 20 TABLET ORAL at 09:10

## 2019-11-01 RX ADMIN — FAMOTIDINE 20 MG: 20 TABLET ORAL at 09:10

## 2019-11-01 ASSESSMENT — PAIN SCALES - GENERAL: PAINLEVEL_OUTOF10: 0

## 2019-11-01 NOTE — PLAN OF CARE
Problem: Pain:  Goal: Pain level will decrease  Description  Pain level will decrease  Outcome: Ongoing  Note:   Patient able to use 0-10 pain scale. Denies pain at this time. Patient has not complained of any pain this shift. Patient stated pain goal is \"0/10\". Problem: Pain:  Goal: Control of acute pain  Description  Control of acute pain  Outcome: Ongoing  Note:   Patient able to use 0-10 pain scale. Denies pain at this time. Patient has not complained of any pain this shift. Patient stated pain goal is \"0/10\". Problem: Pain:  Goal: Control of chronic pain  Description  Control of chronic pain  Outcome: Ongoing  Note:   Patient able to use 0-10 pain scale. Denies pain at this time. Patient has not complained of any pain this shift. Patient stated pain goal is \"0/10\". Problem: Falls - Risk of:  Goal: Will remain free from falls  Description  Will remain free from falls  11/1/2019 0318 by Hayes Adams RN  Outcome: Ongoing  Note:   Patient was placed on fall precautions. Fall sign posted. Bed rails maintained at a minimum of 2/4. Call light in reach, bed in lowest position, bed alarm activated. Educated on use of call light before ambulation and when in need of assistance. Patient expressed understanding. Hourly visual checks performed and charted. Toileting offered to patient. No falls during shift, at this time. Arm band & falling star in place. Continuing to monitor. Problem: Falls - Risk of:  Goal: Absence of physical injury  Description  Absence of physical injury  Outcome: Ongoing  Note:   Patient was placed on fall precautions. Fall sign posted. Bed rails maintained at a minimum of 2/4. Call light in reach, bed in lowest position, bed alarm activated. Educated on use of call light before ambulation and when in need of assistance. Patient expressed understanding. Hourly visual checks performed and charted. Toileting offered to patient. No falls during shift, at this time.  Arm band &

## 2019-11-01 NOTE — PROGRESS NOTES
This RN updated Dr. Josep Ferrera of patient's most recent blood pressure of 135/68 and pulse of 107. Conferring with her that patient is able to be discharged from her stand point and she states yes patient can be discharged.

## 2019-11-01 NOTE — PROGRESS NOTES
[address] [address]  INPATIENT PHYSICAL THERAPY  DAILY NOTE  [facility] 4A - [id number]    Time In:   Time Out: 6238  Timed Code Treatment Minutes: 16 Minutes  Minutes: 16          Date: 2019  Patient Name: Lian Franco,  Gender:  female        MRN: [id number]  : 1940  (75 y.o.)     Referring Practitioner: Jorge Rivera PA-C  Diagnosis: Subarachnoid bleed  Additional Pertinent Hx: Lian Franco is a 67year old white female smoker with PMH HTN who presented to [facility] on 10/28/19 via son for multiple falls in the last week and son noted altered mental status with slowed completion of tasks and speaking. At [address] [place] [address] patient's CT showed subarachnoid hemorrhage, subdural hematoma, C5 fracture, T8 fracture and old T12 and L1 fractures. Patient placed in C-collar. Also found to have potassium of 2.8, given 40mEq K and transferred to [facility]. Patient admitted to [facility] ICU. Potassium found to be 3.1, replacement given. Patient has dizziness when laying, but she states this is not new for her. Denies CP, SOB, abdominal pain, HA, changes in vision. Per neurosurgery, mild T8 compression fracture, likely subacute as patient denies back pain and has no tenderness over midback; old T12 & L1 compression fractures; degenerative cervical degenerative changes without neck tenderness, ccollar DC'd, ok for activity as tolerated. Prior Level of Function:  Lives With: Other (comment)(son & grandchildren)  Type of Home: House  Home Layout: One level  Home Access: Level entry  Home Equipment: Rolling walker        ADL Assistance: Needs assistance(Pt reports her grandson helps her bathe & dress)  Homemaking Responsibilities: No  Ambulation Assistance: Independent  Transfer Assistance: Independent  Active : No  Additional Comments: Pt states amb with walker, multiple falls recently. No family present to commment on PLOF.      Restrictions/Precautions:  Restrictions/Precautions: Fall tolerance of  treatment: good. Equipment Recommendations:Equipment Needed: No  Discharge Recommendations: Continue to assess progress; Subacute/Skilled Nursing Facility    Plan: Times per week: 5-6 X N  Current Treatment Recommendations: Strengthening, Patient/Caregiver Education & Training, Balance Training, Functional Mobility Training, Transfer Training, Safety Education & Training    Patient Education  Patient Education: Plan of Care, Home Exercise Program    Goals:  Patient goals : plan for NH for rehab  Short term goals  Time Frame for Short term goals: by discharge  Short term goal 1: Pt to transfer supine <--> sit SBA to enable pt to get in/out of bed. Short term goal 2: Pt to transfer sit <--> stand CGA for increased functional mobility. Short term goal 3: PT to assess gait. - MET, see revisions  Revised Short-Term Goals:    Short term goals  Time Frame for Short term goals: by discharge  Short term goal 1: Pt to transfer supine <--> sit SBA to enable pt to get in/out of bed. Short term goal 2: Pt to transfer sit <--> stand CGA for increased functional mobility. Short term goal 3: Pt to ambulate > 30 ft with RW with CGA x 1 for household distances    Long term goals  Time Frame for Long term goals : NA due to short length of stay. Following session, patient left in safe position with all fall risk precautions in place. Garcia Joyner.  Katia Coles [place] [address]

## 2019-11-01 NOTE — DISCHARGE SUMMARY
Discharge Summary   Dr. Flores [facility] Surgery    Patient Identification:  Martina Arita  : 1940  MRN: [id number]   Account: [de-identified]     Admit date: 10/29/2019  Discharge date: 2019   Attending provider: Roel Colunga MD        Primary care provider: Brenda Alvarez MD     Discharge Diagnoses: Active Problems:    Tobacco dependence    Subarachnoid bleed (HCC)    Closed head injury    Subdural bleeding (HCC)    Compression fracture of T8 vertebra (HCC)    Falls    Acute encephalopathy    Diabetes mellitus (HCC)    Hypokalemia    Essential hypertension    Acute metabolic encephalopathy    Sinus tachycardia    Hypomagnesemia  Resolved Problems:    * No resolved hospital problems. [facility] Course: Martina Arita is a 67 y.o. female admitted to [address] [place] [address] on 10/29/2019 for treatment of injuries sustained in a fall at home. She suffered a subarachnoid and subdural hemorrhage as well as a T8 compression fracture. She was treated non operatively for the intracranial hemorrhages with serial CT scans. She had no tenderness over her midback and was therefore not treated for her T8 compression fracture. She remained neurologically intact. Due to the fact that she lived at home and would need more rehabilitation prior to returning home, she was discharged to the [address] [place] [address] DrTw[facility]             Discharge Medications:   Manohar Meager   Home Medication Instructions RED:[id number]    Printed on:19 1523   Medication Information                      alendronate (FOSAMAX) 70 MG tablet  Take 70 mg by mouth every 7 days             aspirin 81 MG tablet  Take 1 tablet by mouth daily             docusate sodium (COLACE, DULCOLAX) 100 MG CAPS  Take 100 mg by mouth 2 times daily as needed for Constipation             lisinopril-hydrochlorothiazide (PRINZIDE;ZESTORETIC) 20-12.5 MG per tablet  Take 1 tablet by mouth daily             metFORMIN (GLUCOPHAGE) 500 MG tablet  Take 1 tablet by mouth daily (with breakfast)             metoprolol tartrate (LOPRESSOR) 25 MG tablet  Take 0.5 tablets by mouth 2 times daily             nicotine (NICODERM CQ) 14 MG/24HR  Place 1 patch onto the skin daily as needed (nicotine craving)             potassium chloride (KLOR-CON M) 20 MEQ extended release tablet  Take 1 tablet by mouth 2 times daily (with meals)             traMADol (ULTRAM) 50 MG tablet  Take 1 tablet by mouth every 6 hours as needed for Pain for up to 7 days. Patient Instructions:     Activity: activity as tolerated  Diet: Dietary Nutrition Supplements: Diabetic Oral Supplement  DIET LOW SODIUM 2 GM; Dental Soft    Code Status: Full Code    Follow-up visits:   Soraya Hightower MD  [address] [address]  [phone]          Naty Latif MD  [address]          Jovan Mckeon [facility] [address] [facility]  [phone]    On 11/27/2019  hospital follow up with images, on November 27, 2019 at 2:00pm    CM [facility]  [address] [place] [address]             Consults:   Neurosurgery and Intensivist    Examination:  Vitals:  Vitals:    11/01/19 0804 11/01/19 1056 11/01/19 1203 11/01/19 1500   BP: (!) 149/73  138/73 135/68   Pulse: 95  124 107   Resp: 16 16 20 23   Temp: 98.1 °F (36.7 °C)  98.4 °F (36.9 °C) 98.3 °F (36.8 °C)   TempSrc: Oral  Oral Oral   SpO2: 91% 92% 93% 94%   Weight:       Height:         Weight: Weight: 149 lb 3.2 oz (67.7 kg)     24 hour intake/output:    Intake/Output Summary (Last 24 hours) at 11/1/2019 1523  Last data filed at 11/1/2019 0452  Gross per 24 hour   Intake 100 ml   Output --   Net 100 ml           Significant Diagnostics:   Radiology: Ct Head Without Contrast    Result Date: 10/29/2019  PROCEDURE: CT HEAD WO CONTRAST CLINICAL INFORMATION: follow up on SAH, SDH. COMPARISON: Noncontrast brain CT performed at [facility] on or mass. No pneumoperitoneum. Abdominal and pelvic body wall soft tissues: Unremarkable Musculoskeletal structures: There is a probably acute versus subacute mild to moderate compression fracture of T8. There are old moderate to severe T12 and L1 burst fractures with mild retropulsion into the spinal canal and mild spinal stenosis. There is  L3-4, L4-5 and L5-S1 degenerative disc disease, most severe at L5-S1. There is multilevel lumbar facet joint DJD. No acute traumatic abnormality of the solid or hollow viscera of the abdomen and pelvis. No acute inflammatory or infectious process in the abdomen or pelvis. No evidence of bowel obstruction. Colonic diverticulosis without diverticulitis. Small amount of fluid in the pelvis. Masslike area in the central uterus which may represent a large fibroid although endometrial mass cannot be excluded. Recommend pelvic ultrasound correlation. Nonspecific bladder wall thickening. Probably acute versus subacute mild to moderate T8 compression fracture. Additional findings as detailed above. **This report has been created using voice recognition software. It may contain minor errors which are inherent in voice recognition technology. ** Final report electronically signed by Dr. Sony Thurman on 10/29/2019 4:05 AM    Ct Interpretation Of Outside Images    Result Date: 10/29/2019  PROCEDURE: CT INTERPRETATION OF OUTSIDE IMAGES CLINICAL INFORMATION: transfer. COMPARISON: No prior study. TECHNIQUE: Thoracic spine CT was performed at [facility] on 10/28/2019 at 8:58 PM and submitted for interpretation. FINDINGS: There is an acute versus subacute mild to moderate T8 compression fracture. There are old moderate to severe T12 and L1 burst fractures with mild retropulsion into the spinal canal and mild spinal stenosis. There is no subluxation. There is multilevel mild midthoracic disc space narrowing consistent with degenerative disc disease.  There is multilevel mild endplate spondylosis. At T7-8 there is a central disc protrusion with mild spinal stenosis and ventral thecal sac impingement. At T8-9 there is a central disc protrusion with mild spinal stenosis and ventral thecal sac impingement. At T11-12 there is mild spinal stenosis secondary to the T12 burst fracture due to retropulsion of the posterior cortex of T12 into the spinal canal. The paravertebral soft tissues are unremarkable. Acute versus subacute mild to moderate T8 compression fracture. Old moderate to severe at T12 and L1 burst fractures with mild retropulsion into the spinal canal and mild spinal stenosis. T7-8 and T8-9 central disc protrusion with mild spinal stenosis and ventral thecal sac impingement. See the accompanying chest CT report for discussion of nonthoracic spine findings. **This report has been created using voice recognition software. It may contain minor errors which are inherent in voice recognition technology. ** Final report electronically signed by Dr. Lc Artis on 10/29/2019 3:13 AM    Ct Interpretation Of Outside Images    Result Date: 10/29/2019  PROCEDURE: CT INTERPRETATION OF OUTSIDE IMAGES CLINICAL INFORMATION: interpretation . COMPARISON: No prior study. TECHNIQUE: lumbar spine CT was performed at [facility] on 10/28/2019 at 8:59 PM and submitted for interpretation. All CT scans at this facility use dose modulation, iterative reconstruction, and/or weight-based dosing when appropriate to reduce radiation dose to as low as reasonably achievable. FINDINGS: There are 5  non-rib-bearing lumbar vertebrae. There is an age-indeterminate, probably acute or subacute mild to moderate compression fracture of T8. There are old appearing moderate to severe fractures of T12 and L1 with mild retropulsion into the spinal canal consistent with burst fractures. There  is very mild spinal stenosis at T12. There is no subluxation.  There is moderate L5-S1 disc space narrowing consistent with degenerative disc disease. There is multilevel mid and lower lumbar facet joint DJD. The paravertebral soft tissues are unremarkable. The SI joints are unremarkable. At L1-2, there is no annular bulge, spinal stenosis, focal disc protrusion, or nerve root impingement. At L2-3, there is no annular bulge, spinal stenosis, focal disc protrusion, or nerve root impingement. At L3-4, there is an annular bulge with ligamentum flavum thickening and facet hypertrophic spurring with moderate spinal stenosis. No definite nerve root impingement. . At L4-5, there is a mild annular bulge without significant spinal stenosis. There is possible impingement upon the left L4 nerve root as it exits the foramen. .. At L5-S1, there is no annular bulge, spinal stenosis, focal disc protrusion, or nerve root impingement. Probably acute or subacute mild to moderate T8 compression fracture. Old appearing moderate to severe T12 and L1 burst fractures with very mild T12 spinal stenosis. Moderate L5-S1 degenerative disc disease. Multilevel mid and lower lumbar facet joint DJD. L3-4 annular bulge with moderate spinal stenosis. L4-5 mild annular bulge with possible impingement on the left L4 nerve root. See the accompanying abdomen pelvis CT report for discussion of nonspine findings. **This report has been created using voice recognition software. It may contain minor errors which are inherent in voice recognition technology. ** Final report electronically signed by Dr. Luiz Jack on 10/29/2019 2:59 AM    Ct Interpretation Of Outside Images    Result Date: 10/29/2019  PROCEDURE: CT INTERPRETATION OF OUTSIDE IMAGES CLINICAL INFORMATION: transfer. COMPARISON: Noncontrast brain CT performed at [facility] on 1/2/2019 TECHNIQUE: Noncontrast brain CT was performed at [facility] on 10/28/2019 at 8:48 PM and submitted for interpretation.  FINDINGS: Brain: There is mild localized subarachnoid or subdural hemorrhage along the anterior Blood, Urine TRACE (A) NEGATIVE    pH, UA 7.5 5.0 - 9.0    Protein, UA NEGATIVE NEGATIVE    Urobilinogen, Urine 2.0 (A) 0.0 - 1.0 eu/dl    Nitrite, Urine NEGATIVE NEGATIVE    Leukocyte Esterase, Urine SMALL (A) NEGATIVE    Color, UA YELLOW STRAW-YELL    Character, Urine SL CLOUDY CLEAR-SL C    RBC, UA 3-5 0-2/hpf /hpf    WBC, UA 2-4 0-4/hpf /hpf    Epi Cells 3-5 3-5/hpf /hpf    Bacteria, UA NONE FEW/NONE S /hpf    Casts UA NONE SEEN NONE SEEN /lpf    Crystals NONE SEEN NONE SEEN    Renal Epithelial, Urine NONE SEEN NONE SEEN    Yeast, UA NONE SEEN NONE SEEN    CASTS 2 NONE SEEN NONE SEEN /lpf    MISCELLANEOUS 2 NONE SEEN    POCT glucose    Collection Time: 10/31/19  5:20 PM   Result Value Ref Range    POC Glucose 113 (H) 70 - 108 mg/dl   Potassium    Collection Time: 10/31/19  6:19 PM   Result Value Ref Range    Potassium 3.7 3.5 - 5.2 meq/L   POCT glucose    Collection Time: 10/31/19  7:59 PM   Result Value Ref Range    POC Glucose 186 (H) 70 - 108 mg/dl   Magnesium    Collection Time: 11/01/19  3:35 AM   Result Value Ref Range    Magnesium 2.0 1.6 - 2.4 mg/dL   Basic Metabolic Panel    Collection Time: 11/01/19  3:35 AM   Result Value Ref Range    Sodium 138 135 - 145 meq/L    Potassium 3.5 3.5 - 5.2 meq/L    Chloride 103 98 - 111 meq/L    CO2 21 (L) 23 - 33 meq/L    Glucose 124 (H) 70 - 108 mg/dL    BUN 11 7 - 22 mg/dL    CREATININE 0.5 0.4 - 1.2 mg/dL    Calcium 8.9 8.5 - 10.5 mg/dL   Anion Gap    Collection Time: 11/01/19  3:35 AM   Result Value Ref Range    Anion Gap 14.0 8.0 - 16.0 meq/L   Glomerular Filtration Rate, Estimated    Collection Time: 11/01/19  3:35 AM   Result Value Ref Range    Est, Glom Filt Rate >90 ml/min/1.73m2   POCT glucose    Collection Time: 11/01/19  9:12 AM   Result Value Ref Range    POC Glucose 115 (H) 70 - 108 mg/dl   EKG 12 Lead    Collection Time: 11/01/19  9:52 AM   Result Value Ref Range    Ventricular Rate 118 BPM    Atrial Rate 118 BPM    P-R Interval 196 ms    QRS Duration 68 ms    Q-T Interval 286 ms    QTc Calculation (Bazett) 400 ms    P Axis 62 degrees    R Axis -18 degrees    T Axis 94 degrees   POCT glucose    Collection Time: 11/01/19 12:41 PM   Result Value Ref Range    POC Glucose 143 (H) 70 - 108 mg/dl       Discharge condition: stable  Disposition:  To a non-[facility] facility  Time spent on discharge: >35 minutes    Electronically signed by KENYATTA Urbina CNP on 11/1/2019 at 3:23 PM

## 2019-11-01 NOTE — PROGRESS NOTES
Hospitalist Progress Note    Patient: Olive Cha  YOB: 1940  MRN: [id number]   PCP: Jaimie Espinoza MD         Acct: [de-identified]  Unit/Bed: [address] [place] [address]    Date of Admission: 10/29/2019      ASSESSMENT     1. Traumatic subarachnoid hemorrhage:  [place] CT head results: mild subarachnoid or subdural hemorrhage along anterior falx more pronounced on the left. No acute ischemic infarct or mass effect. Patient is oriented to person, place, president, season but not month or year. Equal strength in UE and LE. Hold ASA. Repeat CT this morning. Trauma/Neurosurgery following. 2. Spinal fractures:  [place] CT Chest results: Acute vs subacute mild to moderate T8 compression fractures, old moderate to severe T12, L1 burst fractures with mild spinal stenosis. 3 mm anterolisthesis of C5 on C6 is probably artifactual as the spinal laminar line is intact and motion artifact is present. Patient has equal sensation and strength in LE and UE. Denies numbness tingling. Patient states has had some new incontinence the past week  C-collar in place. Neurosurgery  3. Acute metabolic encephalopathy: likely 2/2 subarachnoid hemorrhage vs ?beginning of dementia. Urine culture unremarkable with growth of contaminants. Patient's only symptom is polyuria and may be related to new diagnosis of DM Type 2, Vitamin B12, folate, TSH unremarkable. 4. Sinus tachycardia- noted on 11/1EKG showed sinus tachycardia. Likely still volume depleted depleted. Started on metoprolol for better BP control which should help  5. New diagnosis of DM Type 2, HgbA1C 6.9, started on metformin  6. Hypokalemia: replace prn  7. Hypomagnesemia: replace prn  8. Essential Hypertension: per history. /84. Continue home lisinopril-HCTZ 20mg-12.5mg. Add potassium supplements  9. Tobacco dependence: nicotine patch? PLAN     1. OK with discharge today if BP and tachycardia controlled at time of discharge.   2. Recommend check BG bid since just traMADol    Ins and outs:      Intake/Output Summary (Last 24 hours) at 11/1/2019 1253  Last data filed at 11/1/2019 0452  Gross per 24 hour   Intake 1260.43 ml   Output 125 ml   Net 1135.43 ml       Physical Examination     /73   Pulse 124   Temp 98.4 °F (36.9 °C) (Oral)   Resp 20   Ht 5' 5\" (1.651 m)   Wt 149 lb 3.2 oz (67.7 kg)   SpO2 93%   BMI 24.83 kg/m²     General appearance: No apparent distress. Sitting in chiar. Appears to be leaning towards the left side. HEENT: Extraocular motion intact. Neck: Supple  Respiratory:  CTA bilaterally except for decreased breath sounds at bilateral lung bases  Cardiovascular: RRR with normal S1/S2 without murmurs, rubs or gallops. Abdomen: Soft, non-tender, non-distended with normal bowel sounds. Musculoskeletal: Patient is moving extremities x 4 spontaneously  Neurologic: Grossly non focal. CN: II-XII intact. Generalized weakness-   Psychiatric: Alert and oriented  Vascular: Dorsalis pedis palpable bilaterally. Radial pulses palpable bilaterally. No peripheral edema  Skin:  No visible rashes or lesions. Labs     Recent Labs     10/31/19  0332   WBC 10.5   HGB 14.1   HCT 41.3        Recent Labs     10/29/19  1416 10/31/19  0332 10/31/19  1819 11/01/19  0335    139  --  138   K 3.9 2.7* 3.7 3.5    101  --  103   CO2 25 21*  --  21*   BUN 11 8  --  11   CREATININE 0.5 0.5  --  0.5   CALCIUM 9.3 8.9  --  8.9     No results for input(s): AST, ALT, BILIDIR, BILITOT, ALKPHOS in the last 72 hours. No results for input(s): INR in the last 72 hours. No results for input(s): Eulene Sarks in the last 72 hours.     Urinalysis:      Lab Results   Component Value Date    NITRU NEGATIVE 10/31/2019    WBCUA 2-4 10/31/2019    BACTERIA NONE 10/31/2019    RBCUA 3-5 10/31/2019    BLOODU TRACE 10/31/2019    SPECGRAV >1.030 10/29/2019    GLUCOSEU NEGATIVE 10/31/2019       Diagnostic imaging/procedures           Ct Head Without Contrast    Result Date: 10/29/2019  PROCEDURE: CT HEAD WO CONTRAST CLINICAL INFORMATION: follow up on SAH, SDH. COMPARISON: Noncontrast brain CT performed at [facility] on 10/28/2019. TECHNIQUE: Noncontrast 5 mm axial images were obtained through the brain. Sagittal and coronal reconstructions were obtained and reviewed. All CT scans at this facility use dose modulation, iterative reconstruction, and/or weight-based dosing when appropriate to reduce radiation dose to as low as reasonably achievable. FINDINGS: Brain: There is no change in the bilateral focal anterior frontal region parafalcine subdural versus subarachnoid hemorrhage, again slightly larger on the left measuring up to 9 to 10 mm and 3 mm on the right. There may also be a small amount of subdural  hemorrhage along the posterior falx in the occipital region adjacent to the torcula, stable in retrospect on the prior study. This measures 3.5 mm. There is no acute ischemic infarct, midline shift, mass, or mass effect. There is no intraparenchymal hemorrhage. Mild deep white matter small vessel chronic ischemic changes are noted. There is age appropriate cortical atrophy. Ventricles: The ventricles, cisterns and cortical sulci are enlarged concordant with the mild to moderate degree of age-appropriate atrophy. No obstructive hydrocephalus. There is no intraventricular hemorrhage. Skull base/calvarium/osseous structures: Unremarkable Soft tissues: Unremarkable Intraorbital contents: Unremarkable Sinuses: Unremarkable; the imaged sinuses are clear without evidence of mucosal thickening or fluid levels. Mastoids: Unremarkable; the mastoid air cells are clear. Stable small bilateral frontal region parafalcine subdural versus subarachnoid hemorrhage, larger on the left. Possible small amount of subdural hemorrhage along the posterior falx near the level of the torcula measuring 3.5 mm, unchanged compared to the prior study.  Stable appearance of the brain compared to the urinary bladder. Intraperitoneal/retroperitoneal Space: There is a small amount of fluid in the pelvis. There is no generalized ascites, abscess, adenopathy, or mass. No pneumoperitoneum. Abdominal and pelvic body wall soft tissues: Unremarkable Musculoskeletal structures: There is a probably acute versus subacute mild to moderate compression fracture of T8. There are old moderate to severe T12 and L1 burst fractures with mild retropulsion into the spinal canal and mild spinal stenosis. There is  L3-4, L4-5 and L5-S1 degenerative disc disease, most severe at L5-S1. There is multilevel lumbar facet joint DJD. No acute traumatic abnormality of the solid or hollow viscera of the abdomen and pelvis. No acute inflammatory or infectious process in the abdomen or pelvis. No evidence of bowel obstruction. Colonic diverticulosis without diverticulitis. Small amount of fluid in the pelvis. Masslike area in the central uterus which may represent a large fibroid although endometrial mass cannot be excluded. Recommend pelvic ultrasound correlation. Nonspecific bladder wall thickening. Probably acute versus subacute mild to moderate T8 compression fracture. Additional findings as detailed above. **This report has been created using voice recognition software. It may contain minor errors which are inherent in voice recognition technology. ** Final report electronically signed by Dr. Angela Lan on 10/29/2019 4:05 AM    Ct Interpretation Of Outside Images    Result Date: 10/29/2019  PROCEDURE: CT INTERPRETATION OF OUTSIDE IMAGES CLINICAL INFORMATION: transfer. COMPARISON: No prior study. TECHNIQUE: Thoracic spine CT was performed at [facility] on 10/28/2019 at 8:58 PM and submitted for interpretation. FINDINGS: There is an acute versus subacute mild to moderate T8 compression fracture. There are old moderate to severe T12 and L1 burst fractures with mild retropulsion into the spinal canal and mild spinal stenosis. consistent with burst fractures. There  is very mild spinal stenosis at T12. There is no subluxation. There is moderate L5-S1 disc space narrowing consistent with degenerative disc disease. There is multilevel mid and lower lumbar facet joint DJD. The paravertebral soft tissues are unremarkable. The SI joints are unremarkable. At L1-2, there is no annular bulge, spinal stenosis, focal disc protrusion, or nerve root impingement. At L2-3, there is no annular bulge, spinal stenosis, focal disc protrusion, or nerve root impingement. At L3-4, there is an annular bulge with ligamentum flavum thickening and facet hypertrophic spurring with moderate spinal stenosis. No definite nerve root impingement. . At L4-5, there is a mild annular bulge without significant spinal stenosis. There is possible impingement upon the left L4 nerve root as it exits the foramen. .. At L5-S1, there is no annular bulge, spinal stenosis, focal disc protrusion, or nerve root impingement. Probably acute or subacute mild to moderate T8 compression fracture. Old appearing moderate to severe T12 and L1 burst fractures with very mild T12 spinal stenosis. Moderate L5-S1 degenerative disc disease. Multilevel mid and lower lumbar facet joint DJD. L3-4 annular bulge with moderate spinal stenosis. L4-5 mild annular bulge with possible impingement on the left L4 nerve root. See the accompanying abdomen pelvis CT report for discussion of nonspine findings. **This report has been created using voice recognition software. It may contain minor errors which are inherent in voice recognition technology. ** Final report electronically signed by Dr. Patricia Rosales on 10/29/2019 2:59 AM    Ct Interpretation Of Outside Images    Result Date: 10/29/2019  PROCEDURE: CT INTERPRETATION OF OUTSIDE IMAGES CLINICAL INFORMATION: transfer.  COMPARISON: Noncontrast brain CT performed at [facility] on 1/2/2019 TECHNIQUE: Noncontrast brain CT was performed at [facility] on 10/28/2019 at 8:48 PM and submitted for interpretation. FINDINGS: Brain: There is mild localized subarachnoid or subdural hemorrhage along the anterior falx, more pronounced on the left with a maximal thickness of 8 to 9 mm, measuring 2 to 3 mm on the right. There is no acute ischemic infarct, midline shift, mass, or mass effect. There is no focal abnormality of brain parenchymal attenuation. Mild deep white matter small vessel chronic ischemic changes are noted. There is age appropriate cortical atrophy. Ventricles: The ventricles, cisterns and cortical sulci are enlarged concordant with the mild to moderate degree of age-appropriate atrophy. No obstructive hydrocephalus. Skull base/calvarium/osseous structures: Unremarkable Soft tissues: Unremarkable Intraorbital contents: Unremarkable Sinuses: Unremarkable; the imaged sinuses are clear without evidence of mucosal thickening or fluid levels. Mastoids: Unremarkable; the mastoid air cells are clear. Mild subarachnoid or subdural hemorrhage along the anterior falx, more pronounced on the left as discussed above. No acute ischemic infarct or mass effect. Aging changes as discussed above. **This report has been created using voice recognition software. It may contain minor errors which are inherent in voice recognition technology. ** Final report electronically signed by Dr. Nupur Lino on 10/29/2019 2:29 AM    Ct Interpretation Of Outside Images    Result Date: 10/29/2019  PROCEDURE: CT INTERPRETATION OF OUTSIDE IMAGES CLINICAL INFORMATION: transfer. COMPARISON: Cervical spine CT dated 1/2/2019 performed at [address] [place] [address] St: Noncontrast cervical spine CT was performed at [facility] on 10/28/2019 at 8:51 PM and submitted for interpretation. FINDINGS: Vertebrae: There is no acute fracture. There is stable 2 mm retrolisthesis of C2 on C3 and 2 mm retrolisthesis of C3 on C4.  There is the suggestion of 3 mm anterolisthesis of C5 on C6 however the spinal laminar line is intact and the suggestion of anterolisthesis is probably be due to motion artifact which is readily apparent on the axial images through this region There is multilevel endplate spondylosis, uncovertebral joint DJD, and facet joint DJD. Disc spaces/neural foramina: There is moderate C2-3 and C3-4 disc space narrowing consistent with degenerative disc disease. At C2-3 and C3-4 there are mild disc osteophyte complexes with mild to moderate spinal stenosis, more severe at C3-4. There is ventral cord impingement and cord compression at C3-4. There is moderate right C3-4 neural foraminal stenosis. Spinal canal: There is no obvious epidural hematoma. Soft tissues: Prevertebral soft tissues are normal. Imaged lungs: The imaged lung apices are clear. Sinuses: The imaged sinuses are clear. . Mastoids: The imaged mastoid air cells are clear. Stable      No acute fracture. Stable 2 mm retrolisthesis of C2 on C3 and C3 on C4. Suggestion of 3 mm anterolisthesis of C5 on C6 is probably artifactual as the spinal laminar line is intact and motion artifact is present as discussed above. If further evaluation is warranted, recommend repeat limited cervical spine CT from C4 through C7 versus obtaining routine cervical spine x-rays. Stable multilevel degenerative disc disease and DJD. Stable C2-3 and C3-4 disc osteophyte complexes with cord compression at C3-4. **This report has been created using voice recognition software. It may contain minor errors which are inherent in voice recognition technology. ** Final report electronically signed by Dr. Virgie Adams on 10/29/2019 2:25 AM      DVT prophylaxis: ? Lovenox                                 ? SCDs                                 ? SQ Heparin                                 ? Encourage ambulation           ? Already on Anticoagulation     Disposition:    ? Home       ? TCU       ? Rehab       ? Psych       ? SNF       ?  [place]

## 2019-11-01 NOTE — PROGRESS NOTES
This RN was told that [facility] is now arriving. This RN was told that they will be here at 1815. This RN called [facility] at [facility] and asked about what time  would be. This RN was informed that they were now entering the building and would be here shortly.

## 2019-11-01 NOTE — PROGRESS NOTES
CLINICAL PHARMACY: DISCHARGE MED RECONCILIATION/REVIEW    [facility]) Select Patient?: No  Total # of Interventions Recommended: 0     Total # Interventions Accepted: 0  Intervention Severity:   - Level 1 Intervention Present?: No   - Level 2 #: 0   - Level 3 #: 0   Time Spent (min): 25    Additional Documentation:    Discharge medication reconciliation reviewed and complete.  Discharging to [facility]    Birdie Lundborg, PharmD, BCPS  11/1/2019  3:35 PM

## 2019-11-01 NOTE — PROGRESS NOTES
[address]  Occupational Therapy  Daily Note  Time:    Time In: 1320  Time Out: 6790  Timed Code Treatment Minutes: 26 Minutes  Minutes: 26          Date: 2019  Patient Name: Jesus Bryant,   Gender: female      Room: -17/017-A  MRN: [id number]  : 1940  (75 y.o.)  Referring Practitioner: JOSE Lopez PA-C  Diagnosis: subaranoid bleed  Additional Pertinent Hx: Per ER note on 10/29/19: Pt being transferred from [facility] ER. It is reported that she arrived to their facility around [address] by her son. It is reported that the patient sustained multiple falls over the past week. Tonight the son noticed that the pt mental status was slightly altered and she was much more slow at completing tasks/answering questions. Lonnie reports that patient's CT scan showed a subarachnoid hemorrhage and a subdural hematoma. It is also reported that pt has a C5 fracture. Pt was placed in a ccollar. Lonnie. Per Neuro consult: Traumatic SAH; mild T8 compression fracture, likely subacute as patient denies back pain and has no tenderness over midback; old T12 & L1 compression fractures; degenerative cervical degenerative changes without neck tenderness    Restrictions/Precautions:  Restrictions/Precautions: Fall Risk  Position Activity Restriction  Spinal Precautions: No Bending, No Lifting, No Twisting  Other position/activity restrictions: hx of dementia; mild T8 compression fracture, old T12 & L1 compression fractures    SUBJECTIVE: Pt drowsy in recliner upon arrival of therapist, eyes closed 95% of session. Pt answered questions with \"yeah\" but often did not follow through with what was asked of her    PAIN: 0/10: no c/o pain during session    COGNITION: Decreased Problem Solving, Decreased Safety Awareness, Impaired Attention, Decreased Arousal and Difficulty Following Commands    ADL:   Eating: Per tech Pt would not initiate feeding self on this date.  Tech was finishing feeding Pt upon arrival of therapist  Lower Extremity Dressing: Dependent. for adjusting slipper socks  Toileting: Dependent. for clean up after incontinent of urine. BALANCE:  Sitting Balance:  CGA-min A; Pt with anterior lean while seated EOB  Standing balance: mod A x 2 with forward flexed posture & little clearance for placement of TAYA STEDY paddles     BED MOBILITY:  Sit to Supine: Maximum Assistance, X 2      TRANSFERS:  Sit to Stand: Moderate Assistance, X 2. onto TAYA STEDY from recliner           ASSESSMENT:     Activity Tolerance:  Patient tolerance of  treatment: fair. Discharge Recommendations: Subacute/Skilled Nursing Facility  Equipment Recommendations: Other: Monitor pending progress  Plan: Times per week: 6x  Current Treatment Recommendations: Balance Training, Self-Care / ADL, Functional Mobility Training, Endurance Training, Cognitive Reorientation, Safety Education & Training    Patient Education  Patient Education: safety with t/fs    Goals  Short term goals  Time Frame for Short term goals: 2 weeks  Short term goal 1: Tolerate standing 2 min with CGA for increased ease of toileting routine  Short term goal 2: Complete sit-stand from various surfaces including toilet with min A & min vcs for technique  Short term goal 3: Complete gentle BUE AROM exercises to increase UB AROM for grooming & UB dressing  Short term goal 4: Tolerate further assessment of functional ambulation by OTR when appropriate  Long term goals  Time Frame for Long term goals : No LTG set d/t short ELOS    Following session, patient left in safe position with all fall risk precautions in place.

## 2019-11-01 NOTE — PROGRESS NOTES
This RN called report to [facility] and the [email or web address]. This RN also faxed the AVS, last dose MAR, and prescriptions to [phone]. This RN faxed discharge information to [facility] asking for a 1630  time. This RN received a call stating that patient is tentatively being picked up at 1815. This RN then updates the [email or web address] of this as well.

## 2019-11-02 LAB
EKG ATRIAL RATE: 118 BPM
EKG P AXIS: 62 DEGREES
EKG P-R INTERVAL: 196 MS
EKG Q-T INTERVAL: 286 MS
EKG QRS DURATION: 68 MS
EKG QTC CALCULATION (BAZETT): 400 MS
EKG R AXIS: -18 DEGREES
EKG T AXIS: 94 DEGREES
EKG VENTRICULAR RATE: 118 BPM

## 2019-11-02 PROCEDURE — 93010 ELECTROCARDIOGRAM REPORT: CPT | Performed by: INTERNAL MEDICINE

## 2019-11-18 ENCOUNTER — HOSPITAL ENCOUNTER (OUTPATIENT)
Dept: GENERAL RADIOLOGY | Age: 79
Discharge: HOME | End: 2019-11-18
Payer: MEDICARE

## 2019-11-18 ENCOUNTER — HOSPITAL ENCOUNTER (OUTPATIENT)
Age: 79
Discharge: HOME | End: 2019-11-18
Payer: MEDICARE

## 2019-11-18 ENCOUNTER — HOSPITAL ENCOUNTER (OUTPATIENT)
Dept: CT IMAGING | Age: 79
Discharge: HOME | End: 2019-11-18
Payer: MEDICARE

## 2019-11-18 DIAGNOSIS — I60.9 SUBARACHNOID BLEED (HCC): ICD-10-CM

## 2019-11-18 DIAGNOSIS — S22.060A COMPRESSION FRACTURE OF T8 VERTEBRA, INITIAL ENCOUNTER (HCC): ICD-10-CM

## 2019-11-18 DIAGNOSIS — S12.400A: ICD-10-CM

## 2019-11-18 PROCEDURE — 72072 X-RAY EXAM THORAC SPINE 3VWS: CPT

## 2019-11-18 PROCEDURE — 70450 CT HEAD/BRAIN W/O DYE: CPT

## 2019-11-18 PROCEDURE — 72050 X-RAY EXAM NECK SPINE 4/5VWS: CPT

## 2019-11-27 ENCOUNTER — APPOINTMENT (OUTPATIENT)
Dept: CT IMAGING | Age: 79
DRG: 871 | End: 2019-11-27
Payer: MEDICARE

## 2019-11-27 ENCOUNTER — APPOINTMENT (OUTPATIENT)
Dept: NUCLEAR MEDICINE | Age: 79
DRG: 871 | End: 2019-11-27
Payer: MEDICARE

## 2019-11-27 ENCOUNTER — APPOINTMENT (OUTPATIENT)
Dept: GENERAL RADIOLOGY | Age: 79
DRG: 871 | End: 2019-11-27
Payer: MEDICARE

## 2019-11-27 ENCOUNTER — HOSPITAL ENCOUNTER (INPATIENT)
Age: 79
LOS: 9 days | Discharge: NURSING, REHAB OR LONG-TERM CARE | DRG: 871 | End: 2019-12-06
Attending: FAMILY MEDICINE | Admitting: FAMILY MEDICINE
Payer: MEDICARE

## 2019-11-27 DIAGNOSIS — R77.8 ELEVATED TROPONIN: ICD-10-CM

## 2019-11-27 DIAGNOSIS — E87.5 HYPERKALEMIA: ICD-10-CM

## 2019-11-27 DIAGNOSIS — N17.9 AKI (ACUTE KIDNEY INJURY) (HCC): Primary | ICD-10-CM

## 2019-11-27 DIAGNOSIS — N39.0 URINARY TRACT INFECTION WITH HEMATURIA, SITE UNSPECIFIED: ICD-10-CM

## 2019-11-27 DIAGNOSIS — R31.9 URINARY TRACT INFECTION WITH HEMATURIA, SITE UNSPECIFIED: ICD-10-CM

## 2019-11-27 PROBLEM — R00.0 TACHYCARDIA: Status: ACTIVE | Noted: 2019-11-27

## 2019-11-27 LAB
ALBUMIN SERPL-MCNC: 4.1 G/DL (ref 3.5–5.1)
ALLEN TEST: POSITIVE
ALP BLD-CCNC: 102 U/L (ref 38–126)
ALT SERPL-CCNC: 80 U/L (ref 11–66)
ANION GAP SERPL CALCULATED.3IONS-SCNC: 15 MEQ/L (ref 8–16)
AST SERPL-CCNC: 53 U/L (ref 5–40)
BACTERIA: ABNORMAL
BASE EXCESS (CALCULATED): -6.8 MMOL/L (ref -2.5–2.5)
BASOPHILS # BLD: 0.1 %
BASOPHILS ABSOLUTE: 0 THOU/MM3 (ref 0–0.1)
BILIRUB SERPL-MCNC: 0.4 MG/DL (ref 0.3–1.2)
BILIRUBIN URINE: NEGATIVE
BLOOD, URINE: ABNORMAL
BUN BLDV-MCNC: 106 MG/DL (ref 7–22)
CALCIUM SERPL-MCNC: 9.5 MG/DL (ref 8.5–10.5)
CASTS: ABNORMAL /LPF
CASTS: ABNORMAL /LPF
CHARACTER, URINE: ABNORMAL
CHLORIDE BLD-SCNC: 109 MEQ/L (ref 98–111)
CO2: 18 MEQ/L (ref 23–33)
COLLECTED BY:: ABNORMAL
COLOR: ABNORMAL
CREAT SERPL-MCNC: 2.6 MG/DL (ref 0.4–1.2)
CRYSTALS: ABNORMAL
D-DIMER QUANTITATIVE: 1519 NG/ML FEU (ref 0–500)
DEVICE: ABNORMAL
EKG ATRIAL RATE: 120 BPM
EKG P AXIS: 63 DEGREES
EKG P-R INTERVAL: 196 MS
EKG Q-T INTERVAL: 320 MS
EKG QRS DURATION: 64 MS
EKG QTC CALCULATION (BAZETT): 452 MS
EKG R AXIS: -34 DEGREES
EKG T AXIS: 64 DEGREES
EKG VENTRICULAR RATE: 120 BPM
EOSINOPHIL # BLD: 0.1 %
EOSINOPHILS ABSOLUTE: 0 THOU/MM3 (ref 0–0.4)
EPITHELIAL CELLS, UA: ABNORMAL /HPF
ERYTHROCYTE [DISTWIDTH] IN BLOOD BY AUTOMATED COUNT: 14.6 % (ref 11.5–14.5)
ERYTHROCYTE [DISTWIDTH] IN BLOOD BY AUTOMATED COUNT: 51.6 FL (ref 35–45)
FLU A ANTIGEN: NEGATIVE
FLU B ANTIGEN: NEGATIVE
GFR SERPL CREATININE-BSD FRML MDRD: 18 ML/MIN/1.73M2
GLUCOSE BLD-MCNC: 151 MG/DL (ref 70–108)
GLUCOSE BLD-MCNC: 153 MG/DL (ref 70–108)
GLUCOSE, URINE: NEGATIVE MG/DL
HCO3: 17 MMOL/L (ref 23–28)
HCT VFR BLD CALC: 50.9 % (ref 37–47)
HEMOGLOBIN: 16 GM/DL (ref 12–16)
IFIO2: 2
IMMATURE GRANS (ABS): 0.15 THOU/MM3 (ref 0–0.07)
IMMATURE GRANULOCYTES: 0.9 %
KETONES, URINE: NEGATIVE
LACTIC ACID, SEPSIS: 1.9 MMOL/L (ref 0.5–1.9)
LACTIC ACID, SEPSIS: 2.8 MMOL/L (ref 0.5–1.9)
LEUKOCYTE ESTERASE, URINE: ABNORMAL
LYMPHOCYTES # BLD: 7.9 %
LYMPHOCYTES ABSOLUTE: 1.3 THOU/MM3 (ref 1–4.8)
MCH RBC QN AUTO: 30.2 PG (ref 26–33)
MCHC RBC AUTO-ENTMCNC: 31.4 GM/DL (ref 32.2–35.5)
MCV RBC AUTO: 96.2 FL (ref 81–99)
MISCELLANEOUS LAB TEST RESULT: ABNORMAL
MONOCYTES # BLD: 5.9 %
MONOCYTES ABSOLUTE: 1 THOU/MM3 (ref 0.4–1.3)
NITRITE, URINE: POSITIVE
NUCLEATED RED BLOOD CELLS: 0 /100 WBC
O2 SATURATION: 99 %
OSMOLALITY CALCULATION: 319.5 MOSMOL/KG (ref 275–300)
PCO2: 29 MMHG (ref 35–45)
PH BLOOD GAS: 7.38 (ref 7.35–7.45)
PH UA: 6 (ref 5–9)
PLATELET # BLD: 224 THOU/MM3 (ref 130–400)
PMV BLD AUTO: 11.6 FL (ref 9.4–12.4)
PO2: 141 MMHG (ref 71–104)
POTASSIUM SERPL-SCNC: 6.6 MEQ/L (ref 3.5–5.2)
PROTEIN UA: 30 MG/DL
RBC # BLD: 5.29 MILL/MM3 (ref 4.2–5.4)
RBC URINE: ABNORMAL /HPF
RENAL EPITHELIAL, UA: ABNORMAL
SEG NEUTROPHILS: 85.1 %
SEGMENTED NEUTROPHILS ABSOLUTE COUNT: 13.9 THOU/MM3 (ref 1.8–7.7)
SODIUM BLD-SCNC: 142 MEQ/L (ref 135–145)
SOURCE, BLOOD GAS: ABNORMAL
SPECIFIC GRAVITY UA: 1.01 (ref 1–1.03)
TOTAL PROTEIN: 7.8 G/DL (ref 6.1–8)
TROPONIN T: 0.05 NG/ML
TROPONIN T: 0.05 NG/ML
UROBILINOGEN, URINE: 0.2 EU/DL (ref 0–1)
WBC # BLD: 16.3 THOU/MM3 (ref 4.8–10.8)
WBC UA: ABNORMAL /HPF
YEAST: ABNORMAL

## 2019-11-27 PROCEDURE — 6370000000 HC RX 637 (ALT 250 FOR IP): Performed by: STUDENT IN AN ORGANIZED HEALTH CARE EDUCATION/TRAINING PROGRAM

## 2019-11-27 PROCEDURE — 78582 LUNG VENTILAT&PERFUS IMAGING: CPT

## 2019-11-27 PROCEDURE — 3430000000 HC RX DIAGNOSTIC RADIOPHARMACEUTICAL: Performed by: STUDENT IN AN ORGANIZED HEALTH CARE EDUCATION/TRAINING PROGRAM

## 2019-11-27 PROCEDURE — 80053 COMPREHEN METABOLIC PANEL: CPT

## 2019-11-27 PROCEDURE — 84145 PROCALCITONIN (PCT): CPT

## 2019-11-27 PROCEDURE — A9558 XE133 XENON 10MCI: HCPCS | Performed by: STUDENT IN AN ORGANIZED HEALTH CARE EDUCATION/TRAINING PROGRAM

## 2019-11-27 PROCEDURE — 2700000000 HC OXYGEN THERAPY PER DAY

## 2019-11-27 PROCEDURE — 85025 COMPLETE CBC W/AUTO DIFF WBC: CPT

## 2019-11-27 PROCEDURE — 99285 EMERGENCY DEPT VISIT HI MDM: CPT

## 2019-11-27 PROCEDURE — 84484 ASSAY OF TROPONIN QUANT: CPT

## 2019-11-27 PROCEDURE — 2580000003 HC RX 258: Performed by: STUDENT IN AN ORGANIZED HEALTH CARE EDUCATION/TRAINING PROGRAM

## 2019-11-27 PROCEDURE — 80307 DRUG TEST PRSMV CHEM ANLYZR: CPT

## 2019-11-27 PROCEDURE — 99223 1ST HOSP IP/OBS HIGH 75: CPT | Performed by: FAMILY MEDICINE

## 2019-11-27 PROCEDURE — 2500000003 HC RX 250 WO HCPCS: Performed by: STUDENT IN AN ORGANIZED HEALTH CARE EDUCATION/TRAINING PROGRAM

## 2019-11-27 PROCEDURE — 87186 SC STD MICRODIL/AGAR DIL: CPT

## 2019-11-27 PROCEDURE — 2709999900 HC NON-CHARGEABLE SUPPLY

## 2019-11-27 PROCEDURE — A9540 TC99M MAA: HCPCS | Performed by: STUDENT IN AN ORGANIZED HEALTH CARE EDUCATION/TRAINING PROGRAM

## 2019-11-27 PROCEDURE — 87086 URINE CULTURE/COLONY COUNT: CPT

## 2019-11-27 PROCEDURE — 36600 WITHDRAWAL OF ARTERIAL BLOOD: CPT

## 2019-11-27 PROCEDURE — 87077 CULTURE AEROBIC IDENTIFY: CPT

## 2019-11-27 PROCEDURE — 96375 TX/PRO/DX INJ NEW DRUG ADDON: CPT

## 2019-11-27 PROCEDURE — 94761 N-INVAS EAR/PLS OXIMETRY MLT: CPT

## 2019-11-27 PROCEDURE — 83605 ASSAY OF LACTIC ACID: CPT

## 2019-11-27 PROCEDURE — 85379 FIBRIN DEGRADATION QUANT: CPT

## 2019-11-27 PROCEDURE — 87040 BLOOD CULTURE FOR BACTERIA: CPT

## 2019-11-27 PROCEDURE — 82803 BLOOD GASES ANY COMBINATION: CPT

## 2019-11-27 PROCEDURE — 36415 COLL VENOUS BLD VENIPUNCTURE: CPT

## 2019-11-27 PROCEDURE — 2140000000 HC CCU INTERMEDIATE R&B

## 2019-11-27 PROCEDURE — 70450 CT HEAD/BRAIN W/O DYE: CPT

## 2019-11-27 PROCEDURE — 81001 URINALYSIS AUTO W/SCOPE: CPT

## 2019-11-27 PROCEDURE — 87804 INFLUENZA ASSAY W/OPTIC: CPT

## 2019-11-27 PROCEDURE — 82948 REAGENT STRIP/BLOOD GLUCOSE: CPT

## 2019-11-27 PROCEDURE — 6360000002 HC RX W HCPCS: Performed by: STUDENT IN AN ORGANIZED HEALTH CARE EDUCATION/TRAINING PROGRAM

## 2019-11-27 PROCEDURE — 93005 ELECTROCARDIOGRAM TRACING: CPT | Performed by: STUDENT IN AN ORGANIZED HEALTH CARE EDUCATION/TRAINING PROGRAM

## 2019-11-27 PROCEDURE — 84132 ASSAY OF SERUM POTASSIUM: CPT

## 2019-11-27 PROCEDURE — 96365 THER/PROPH/DIAG IV INF INIT: CPT

## 2019-11-27 PROCEDURE — 71045 X-RAY EXAM CHEST 1 VIEW: CPT

## 2019-11-27 RX ORDER — LISINOPRIL 5 MG/1
5 TABLET ORAL DAILY
Status: ON HOLD | COMMUNITY
End: 2019-12-06 | Stop reason: HOSPADM

## 2019-11-27 RX ORDER — SODIUM POLYSTYRENE SULFONATE 15 G/60ML
15 SUSPENSION ORAL; RECTAL ONCE
Status: COMPLETED | OUTPATIENT
Start: 2019-11-27 | End: 2019-11-27

## 2019-11-27 RX ORDER — XENON XE-133 10 MCI/1
9.4 GAS RESPIRATORY (INHALATION)
Status: COMPLETED | OUTPATIENT
Start: 2019-11-27 | End: 2019-11-27

## 2019-11-27 RX ORDER — 0.9 % SODIUM CHLORIDE 0.9 %
30 INTRAVENOUS SOLUTION INTRAVENOUS ONCE
Status: COMPLETED | OUTPATIENT
Start: 2019-11-27 | End: 2019-11-27

## 2019-11-27 RX ORDER — CALCIUM GLUCONATE 94 MG/ML
1 INJECTION, SOLUTION INTRAVENOUS ONCE
Status: COMPLETED | OUTPATIENT
Start: 2019-11-28 | End: 2019-11-28

## 2019-11-27 RX ADMIN — SODIUM BICARBONATE 50 MEQ: 84 INJECTION, SOLUTION INTRAVENOUS at 21:26

## 2019-11-27 RX ADMIN — SODIUM CHLORIDE 2028 ML: 9 INJECTION, SOLUTION INTRAVENOUS at 21:09

## 2019-11-27 RX ADMIN — CEFEPIME HYDROCHLORIDE 1 G: 1 INJECTION, POWDER, FOR SOLUTION INTRAMUSCULAR; INTRAVENOUS at 21:52

## 2019-11-27 RX ADMIN — SODIUM POLYSTYRENE SULFONATE 15 G: 15 SUSPENSION ORAL; RECTAL at 21:32

## 2019-11-27 RX ADMIN — Medication 4.7 MILLICURIE: at 22:30

## 2019-11-27 RX ADMIN — XENON XE-133 9.4 MILLICURIE: 10 GAS RESPIRATORY (INHALATION) at 22:25

## 2019-11-27 RX ADMIN — VANCOMYCIN HYDROCHLORIDE 1000 MG: 1 INJECTION, POWDER, LYOPHILIZED, FOR SOLUTION INTRAVENOUS at 23:06

## 2019-11-27 ASSESSMENT — PAIN SCALES - GENERAL: PAINLEVEL_OUTOF10: 0

## 2019-11-28 ENCOUNTER — APPOINTMENT (OUTPATIENT)
Dept: GENERAL RADIOLOGY | Age: 79
DRG: 871 | End: 2019-11-28
Payer: MEDICARE

## 2019-11-28 PROBLEM — N17.9 ACUTE KIDNEY INJURY (HCC): Status: ACTIVE | Noted: 2019-11-28

## 2019-11-28 PROBLEM — E87.5 HYPERKALEMIA: Status: ACTIVE | Noted: 2019-11-28

## 2019-11-28 PROBLEM — A41.9 SEPSIS (HCC): Status: ACTIVE | Noted: 2019-11-28

## 2019-11-28 PROBLEM — N39.0 UTI (URINARY TRACT INFECTION): Status: ACTIVE | Noted: 2019-11-28

## 2019-11-28 LAB
ALBUMIN SERPL-MCNC: 3.2 G/DL (ref 3.5–5.1)
ALP BLD-CCNC: 77 U/L (ref 38–126)
ALT SERPL-CCNC: 63 U/L (ref 11–66)
AMMONIA: 19 UMOL/L (ref 11–60)
AMPHETAMINE+METHAMPHETAMINE URINE SCREEN: NEGATIVE
ANION GAP SERPL CALCULATED.3IONS-SCNC: 11 MEQ/L (ref 8–16)
ANION GAP SERPL CALCULATED.3IONS-SCNC: 15 MEQ/L (ref 8–16)
AST SERPL-CCNC: 41 U/L (ref 5–40)
BARBITURATE QUANTITATIVE URINE: NEGATIVE
BASOPHILS # BLD: 0.1 %
BASOPHILS ABSOLUTE: 0 THOU/MM3 (ref 0–0.1)
BENZODIAZEPINE QUANTITATIVE URINE: NEGATIVE
BILIRUB SERPL-MCNC: 0.4 MG/DL (ref 0.3–1.2)
BUN BLDV-MCNC: 76 MG/DL (ref 7–22)
BUN BLDV-MCNC: 87 MG/DL (ref 7–22)
CALCIUM SERPL-MCNC: 8.5 MG/DL (ref 8.5–10.5)
CALCIUM SERPL-MCNC: 8.9 MG/DL (ref 8.5–10.5)
CANNABINOID QUANTITATIVE URINE: NEGATIVE
CHLORIDE BLD-SCNC: 114 MEQ/L (ref 98–111)
CHLORIDE BLD-SCNC: 114 MEQ/L (ref 98–111)
CO2: 16 MEQ/L (ref 23–33)
CO2: 22 MEQ/L (ref 23–33)
COCAINE METABOLITE QUANTITATIVE URINE: NEGATIVE
CREAT SERPL-MCNC: 1.4 MG/DL (ref 0.4–1.2)
CREAT SERPL-MCNC: 1.6 MG/DL (ref 0.4–1.2)
EKG ATRIAL RATE: 96 BPM
EKG P AXIS: 70 DEGREES
EKG P-R INTERVAL: 222 MS
EKG Q-T INTERVAL: 326 MS
EKG QRS DURATION: 66 MS
EKG QTC CALCULATION (BAZETT): 411 MS
EKG R AXIS: -27 DEGREES
EKG T AXIS: 58 DEGREES
EKG VENTRICULAR RATE: 96 BPM
EOSINOPHIL # BLD: 0.2 %
EOSINOPHILS ABSOLUTE: 0 THOU/MM3 (ref 0–0.4)
ERYTHROCYTE [DISTWIDTH] IN BLOOD BY AUTOMATED COUNT: 14.6 % (ref 11.5–14.5)
ERYTHROCYTE [DISTWIDTH] IN BLOOD BY AUTOMATED COUNT: 51.3 FL (ref 35–45)
GFR SERPL CREATININE-BSD FRML MDRD: 31 ML/MIN/1.73M2
GFR SERPL CREATININE-BSD FRML MDRD: 36 ML/MIN/1.73M2
GLUCOSE BLD-MCNC: 128 MG/DL (ref 70–108)
GLUCOSE BLD-MCNC: 140 MG/DL (ref 70–108)
HCT VFR BLD CALC: 45.3 % (ref 37–47)
HEMOGLOBIN: 14.1 GM/DL (ref 12–16)
IMMATURE GRANS (ABS): 0.09 THOU/MM3 (ref 0–0.07)
IMMATURE GRANULOCYTES: 0.7 %
LYMPHOCYTES # BLD: 12 %
LYMPHOCYTES ABSOLUTE: 1.6 THOU/MM3 (ref 1–4.8)
MAGNESIUM: 2.2 MG/DL (ref 1.6–2.4)
MCH RBC QN AUTO: 30 PG (ref 26–33)
MCHC RBC AUTO-ENTMCNC: 31.1 GM/DL (ref 32.2–35.5)
MCV RBC AUTO: 96.4 FL (ref 81–99)
MONOCYTES # BLD: 11.4 %
MONOCYTES ABSOLUTE: 1.5 THOU/MM3 (ref 0.4–1.3)
NUCLEATED RED BLOOD CELLS: 0 /100 WBC
OPIATES, URINE: NEGATIVE
OXYCODONE: NEGATIVE
PHENCYCLIDINE QUANTITATIVE URINE: NEGATIVE
PLATELET # BLD: 191 THOU/MM3 (ref 130–400)
PMV BLD AUTO: 11.7 FL (ref 9.4–12.4)
POTASSIUM REFLEX MAGNESIUM: 4.8 MEQ/L (ref 3.5–5.2)
POTASSIUM SERPL-SCNC: 4.2 MEQ/L (ref 3.5–5.2)
POTASSIUM SERPL-SCNC: 5.8 MEQ/L (ref 3.5–5.2)
PROCALCITONIN: 0.27 NG/ML (ref 0.01–0.09)
RBC # BLD: 4.7 MILL/MM3 (ref 4.2–5.4)
SEG NEUTROPHILS: 75.6 %
SEGMENTED NEUTROPHILS ABSOLUTE COUNT: 10.2 THOU/MM3 (ref 1.8–7.7)
SODIUM BLD-SCNC: 145 MEQ/L (ref 135–145)
SODIUM BLD-SCNC: 147 MEQ/L (ref 135–145)
TOTAL PROTEIN: 6.1 G/DL (ref 6.1–8)
TROPONIN T: 0.06 NG/ML
TROPONIN T: 0.07 NG/ML
WBC # BLD: 13.5 THOU/MM3 (ref 4.8–10.8)

## 2019-11-28 PROCEDURE — 36415 COLL VENOUS BLD VENIPUNCTURE: CPT

## 2019-11-28 PROCEDURE — 85025 COMPLETE CBC W/AUTO DIFF WBC: CPT

## 2019-11-28 PROCEDURE — 99221 1ST HOSP IP/OBS SF/LOW 40: CPT | Performed by: PSYCHIATRY & NEUROLOGY

## 2019-11-28 PROCEDURE — 2140000000 HC CCU INTERMEDIATE R&B

## 2019-11-28 PROCEDURE — 2700000000 HC OXYGEN THERAPY PER DAY

## 2019-11-28 PROCEDURE — 6360000002 HC RX W HCPCS: Performed by: INTERNAL MEDICINE

## 2019-11-28 PROCEDURE — 84484 ASSAY OF TROPONIN QUANT: CPT

## 2019-11-28 PROCEDURE — 6360000002 HC RX W HCPCS: Performed by: PSYCHIATRY & NEUROLOGY

## 2019-11-28 PROCEDURE — 99233 SBSQ HOSP IP/OBS HIGH 50: CPT | Performed by: INTERNAL MEDICINE

## 2019-11-28 PROCEDURE — 2580000003 HC RX 258: Performed by: FAMILY MEDICINE

## 2019-11-28 PROCEDURE — 2500000003 HC RX 250 WO HCPCS: Performed by: FAMILY MEDICINE

## 2019-11-28 PROCEDURE — 6370000000 HC RX 637 (ALT 250 FOR IP): Performed by: FAMILY MEDICINE

## 2019-11-28 PROCEDURE — 80048 BASIC METABOLIC PNL TOTAL CA: CPT

## 2019-11-28 PROCEDURE — 71045 X-RAY EXAM CHEST 1 VIEW: CPT

## 2019-11-28 PROCEDURE — 6360000002 HC RX W HCPCS: Performed by: FAMILY MEDICINE

## 2019-11-28 PROCEDURE — 82140 ASSAY OF AMMONIA: CPT

## 2019-11-28 PROCEDURE — 2580000003 HC RX 258: Performed by: PSYCHIATRY & NEUROLOGY

## 2019-11-28 PROCEDURE — 6370000000 HC RX 637 (ALT 250 FOR IP): Performed by: PSYCHIATRY & NEUROLOGY

## 2019-11-28 PROCEDURE — 2580000003 HC RX 258: Performed by: INTERNAL MEDICINE

## 2019-11-28 PROCEDURE — 94760 N-INVAS EAR/PLS OXIMETRY 1: CPT

## 2019-11-28 PROCEDURE — 2500000003 HC RX 250 WO HCPCS: Performed by: INTERNAL MEDICINE

## 2019-11-28 PROCEDURE — 80053 COMPREHEN METABOLIC PANEL: CPT

## 2019-11-28 PROCEDURE — 83735 ASSAY OF MAGNESIUM: CPT

## 2019-11-28 PROCEDURE — 2709999900 HC NON-CHARGEABLE SUPPLY

## 2019-11-28 RX ORDER — LEVETIRACETAM 500 MG/1
500 TABLET ORAL 2 TIMES DAILY
Status: DISCONTINUED | OUTPATIENT
Start: 2019-11-28 | End: 2019-11-28

## 2019-11-28 RX ORDER — LANOLIN ALCOHOL/MO/W.PET/CERES
1000 CREAM (GRAM) TOPICAL DAILY
Status: DISCONTINUED | OUTPATIENT
Start: 2019-11-28 | End: 2019-12-06 | Stop reason: HOSPADM

## 2019-11-28 RX ORDER — DEXTROSE MONOHYDRATE 25 G/50ML
25 INJECTION, SOLUTION INTRAVENOUS ONCE
Status: COMPLETED | OUTPATIENT
Start: 2019-11-28 | End: 2019-11-28

## 2019-11-28 RX ORDER — SODIUM CHLORIDE 9 MG/ML
INJECTION, SOLUTION INTRAVENOUS CONTINUOUS
Status: DISCONTINUED | OUTPATIENT
Start: 2019-11-28 | End: 2019-11-28

## 2019-11-28 RX ORDER — SODIUM CHLORIDE 0.9 % (FLUSH) 0.9 %
10 SYRINGE (ML) INJECTION EVERY 12 HOURS SCHEDULED
Status: DISCONTINUED | OUTPATIENT
Start: 2019-11-28 | End: 2019-12-03

## 2019-11-28 RX ORDER — NICOTINE POLACRILEX 4 MG
15 LOZENGE BUCCAL PRN
Status: DISCONTINUED | OUTPATIENT
Start: 2019-11-28 | End: 2019-12-06 | Stop reason: HOSPADM

## 2019-11-28 RX ORDER — SODIUM CHLORIDE 0.9 % (FLUSH) 0.9 %
10 SYRINGE (ML) INJECTION PRN
Status: DISCONTINUED | OUTPATIENT
Start: 2019-11-28 | End: 2019-12-03

## 2019-11-28 RX ORDER — DEXTROSE MONOHYDRATE 25 G/50ML
12.5 INJECTION, SOLUTION INTRAVENOUS PRN
Status: DISCONTINUED | OUTPATIENT
Start: 2019-11-28 | End: 2019-12-06 | Stop reason: HOSPADM

## 2019-11-28 RX ORDER — DEXTROSE MONOHYDRATE 50 MG/ML
100 INJECTION, SOLUTION INTRAVENOUS PRN
Status: DISCONTINUED | OUTPATIENT
Start: 2019-11-28 | End: 2019-12-06 | Stop reason: HOSPADM

## 2019-11-28 RX ORDER — ASPIRIN 81 MG/1
81 TABLET ORAL DAILY
Status: DISCONTINUED | OUTPATIENT
Start: 2019-11-28 | End: 2019-11-28

## 2019-11-28 RX ADMIN — SODIUM CHLORIDE: 9 INJECTION, SOLUTION INTRAVENOUS at 02:01

## 2019-11-28 RX ADMIN — METOPROLOL TARTRATE 12.5 MG: 25 TABLET ORAL at 02:56

## 2019-11-28 RX ADMIN — CALCIUM GLUCONATE 1 G: 98 INJECTION, SOLUTION INTRAVENOUS at 00:19

## 2019-11-28 RX ADMIN — LEVETIRACETAM 250 MG: 100 INJECTION, SOLUTION INTRAVENOUS at 16:55

## 2019-11-28 RX ADMIN — ASPIRIN 81 MG: 81 TABLET ORAL at 09:12

## 2019-11-28 RX ADMIN — METOPROLOL TARTRATE 12.5 MG: 25 TABLET ORAL at 09:11

## 2019-11-28 RX ADMIN — CEFTRIAXONE SODIUM 1 G: 1 INJECTION, POWDER, FOR SOLUTION INTRAMUSCULAR; INTRAVENOUS at 18:49

## 2019-11-28 RX ADMIN — INSULIN HUMAN 5 UNITS: 100 INJECTION, SOLUTION PARENTERAL at 01:48

## 2019-11-28 RX ADMIN — DEXTROSE MONOHYDRATE 25 G: 500 INJECTION PARENTERAL at 01:49

## 2019-11-28 RX ADMIN — SODIUM BICARBONATE: 84 INJECTION, SOLUTION INTRAVENOUS at 18:49

## 2019-11-28 RX ADMIN — SODIUM BICARBONATE 50 MEQ: 84 INJECTION, SOLUTION INTRAVENOUS at 08:20

## 2019-11-29 LAB
ANION GAP SERPL CALCULATED.3IONS-SCNC: 11 MEQ/L (ref 8–16)
BASOPHILS # BLD: 0.3 %
BASOPHILS ABSOLUTE: 0 THOU/MM3 (ref 0–0.1)
BUN BLDV-MCNC: 61 MG/DL (ref 7–22)
CALCIUM SERPL-MCNC: 8.2 MG/DL (ref 8.5–10.5)
CHARACTER, CSF: CLEAR
CHLORIDE BLD-SCNC: 110 MEQ/L (ref 98–111)
CO2: 25 MEQ/L (ref 23–33)
COLOR CSF: COLORLESS
CREAT SERPL-MCNC: 1.1 MG/DL (ref 0.4–1.2)
CRYPTOCOCCUS NEOFORMANS/GATTI CSF FILM ARR.: NOT DETECTED
CYTOMEGALOVIRUS (CMV) CSF FILM ARRAY: NOT DETECTED
ENTEROVIRUS DETECTION PCR: NOT DETECTED
EOSINOPHIL # BLD: 0.9 %
EOSINOPHILS ABSOLUTE: 0.1 THOU/MM3 (ref 0–0.4)
ERYTHROCYTE [DISTWIDTH] IN BLOOD BY AUTOMATED COUNT: 14.4 % (ref 11.5–14.5)
ERYTHROCYTE [DISTWIDTH] IN BLOOD BY AUTOMATED COUNT: 50.2 FL (ref 35–45)
ESCHERICHIA COLI K1 CSF FILM ARRAY: NOT DETECTED
GFR SERPL CREATININE-BSD FRML MDRD: 48 ML/MIN/1.73M2
GLUCOSE BLD-MCNC: 146 MG/DL (ref 70–108)
GLUCOSE, CSF: 90 MG/DL (ref 40–80)
HAEMOPHILUS INFLUENZA CSF FILM ARRAY: NOT DETECTED
HCT VFR BLD CALC: 39.4 % (ref 37–47)
HEMOGLOBIN: 12.5 GM/DL (ref 12–16)
HHV-6 (HERPESVIRUS 6) CSF FILM ARRAY: NOT DETECTED
HSV-1 CSF FILM ARRAY: NOT DETECTED
HSV-2 CSF FILM ARRAY: NOT DETECTED
IMMATURE GRANS (ABS): 0.11 THOU/MM3 (ref 0–0.07)
IMMATURE GRANULOCYTES: 1 %
LISTERIA MONOCYTOGENES CSF FILM ARRAY: NOT DETECTED
LV EF: 63 %
LVEF MODALITY: NORMAL
LYMPHOCYTES # BLD: 20 %
LYMPHOCYTES ABSOLUTE: 2.2 THOU/MM3 (ref 1–4.8)
LYMPHS CSF: 3 % (ref 0–90)
MAGNESIUM: 1.9 MG/DL (ref 1.6–2.4)
MCH RBC QN AUTO: 30.2 PG (ref 26–33)
MCHC RBC AUTO-ENTMCNC: 31.7 GM/DL (ref 32.2–35.5)
MCV RBC AUTO: 95.2 FL (ref 81–99)
MONOCYTE, CSF: 1 % (ref 0–45)
MONOCYTES # BLD: 9.8 %
MONOCYTES ABSOLUTE: 1.1 THOU/MM3 (ref 0.4–1.3)
NEISSERIA MENIGITIDIS CSF FILM ARRAY: NOT DETECTED
NUCLEATED RED BLOOD CELLS: 0 /100 WBC
ORGANISM: ABNORMAL
PARECHOVIRUS CSF FILM ARRAY: NOT DETECTED
PATHOLOGIST REVIEW: NORMAL
PLATELET # BLD: 161 THOU/MM3 (ref 130–400)
PMV BLD AUTO: 11.4 FL (ref 9.4–12.4)
POTASSIUM SERPL-SCNC: 3.4 MEQ/L (ref 3.5–5.2)
PRO-BNP: 1196 PG/ML (ref 0–1800)
PROTEIN CSF: 51 MG/DL (ref 12–60)
RBC # BLD: 4.14 MILL/MM3 (ref 4.2–5.4)
RBC CSF: 8 /CUMM
SEG NEUTROPHILS: 68 %
SEGMENTED NEUTROPHILS ABSOLUTE COUNT: 7.3 THOU/MM3 (ref 1.8–7.7)
SODIUM BLD-SCNC: 146 MEQ/L (ref 135–145)
STREPTOCOCCUS AGALACTIAE CSF FILM ARRAY: NOT DETECTED
STREPTOCOCCUS PNEUMONIAE CSF FILM ARRAY: NOT DETECTED
TOTAL NUCLEATED CELLS CSF: 1 /CUMM (ref 0–5)
TROPONIN T: 0.01 NG/ML
TROPONIN T: 0.04 NG/ML
TUBE VOLUME RECEIVED CSF: 0.8 ML
URINE CULTURE, ROUTINE: ABNORMAL
VARICELLA-ZOSTER, PCR: NOT DETECTED
WBC # BLD: 10.8 THOU/MM3 (ref 4.8–10.8)

## 2019-11-29 PROCEDURE — 87075 CULTR BACTERIA EXCEPT BLOOD: CPT

## 2019-11-29 PROCEDURE — 97166 OT EVAL MOD COMPLEX 45 MIN: CPT

## 2019-11-29 PROCEDURE — 2709999900 HC NON-CHARGEABLE SUPPLY

## 2019-11-29 PROCEDURE — 97110 THERAPEUTIC EXERCISES: CPT

## 2019-11-29 PROCEDURE — 92610 EVALUATE SWALLOWING FUNCTION: CPT

## 2019-11-29 PROCEDURE — 83880 ASSAY OF NATRIURETIC PEPTIDE: CPT

## 2019-11-29 PROCEDURE — 97163 PT EVAL HIGH COMPLEX 45 MIN: CPT

## 2019-11-29 PROCEDURE — 88112 CYTOPATH CELL ENHANCE TECH: CPT

## 2019-11-29 PROCEDURE — 6360000002 HC RX W HCPCS: Performed by: HOSPITALIST

## 2019-11-29 PROCEDURE — 99233 SBSQ HOSP IP/OBS HIGH 50: CPT | Performed by: HOSPITALIST

## 2019-11-29 PROCEDURE — 009U3ZX DRAINAGE OF SPINAL CANAL, PERCUTANEOUS APPROACH, DIAGNOSTIC: ICD-10-PCS | Performed by: PSYCHIATRY & NEUROLOGY

## 2019-11-29 PROCEDURE — 6360000002 HC RX W HCPCS: Performed by: PSYCHIATRY & NEUROLOGY

## 2019-11-29 PROCEDURE — 94760 N-INVAS EAR/PLS OXIMETRY 1: CPT

## 2019-11-29 PROCEDURE — 82945 GLUCOSE OTHER FLUID: CPT

## 2019-11-29 PROCEDURE — 89051 BODY FLUID CELL COUNT: CPT

## 2019-11-29 PROCEDURE — 84132 ASSAY OF SERUM POTASSIUM: CPT

## 2019-11-29 PROCEDURE — 99233 SBSQ HOSP IP/OBS HIGH 50: CPT | Performed by: PSYCHIATRY & NEUROLOGY

## 2019-11-29 PROCEDURE — 87798 DETECT AGENT NOS DNA AMP: CPT

## 2019-11-29 PROCEDURE — 87205 SMEAR GRAM STAIN: CPT

## 2019-11-29 PROCEDURE — 95819 EEG AWAKE AND ASLEEP: CPT

## 2019-11-29 PROCEDURE — 84157 ASSAY OF PROTEIN OTHER: CPT

## 2019-11-29 PROCEDURE — 84484 ASSAY OF TROPONIN QUANT: CPT

## 2019-11-29 PROCEDURE — 87899 AGENT NOS ASSAY W/OPTIC: CPT

## 2019-11-29 PROCEDURE — 80048 BASIC METABOLIC PNL TOTAL CA: CPT

## 2019-11-29 PROCEDURE — 2580000003 HC RX 258: Performed by: INTERNAL MEDICINE

## 2019-11-29 PROCEDURE — 2580000003 HC RX 258: Performed by: PSYCHIATRY & NEUROLOGY

## 2019-11-29 PROCEDURE — 85025 COMPLETE CBC W/AUTO DIFF WBC: CPT

## 2019-11-29 PROCEDURE — 2500000003 HC RX 250 WO HCPCS: Performed by: INTERNAL MEDICINE

## 2019-11-29 PROCEDURE — 83735 ASSAY OF MAGNESIUM: CPT

## 2019-11-29 PROCEDURE — 93306 TTE W/DOPPLER COMPLETE: CPT

## 2019-11-29 PROCEDURE — 86592 SYPHILIS TEST NON-TREP QUAL: CPT

## 2019-11-29 PROCEDURE — 2140000000 HC CCU INTERMEDIATE R&B

## 2019-11-29 PROCEDURE — 2580000003 HC RX 258: Performed by: HOSPITALIST

## 2019-11-29 PROCEDURE — 36415 COLL VENOUS BLD VENIPUNCTURE: CPT

## 2019-11-29 PROCEDURE — 2580000003 HC RX 258: Performed by: FAMILY MEDICINE

## 2019-11-29 RX ORDER — CEFDINIR 300 MG/1
300 CAPSULE ORAL EVERY 12 HOURS SCHEDULED
Status: DISCONTINUED | OUTPATIENT
Start: 2019-11-29 | End: 2019-11-29

## 2019-11-29 RX ORDER — SODIUM CHLORIDE 9 MG/ML
INJECTION, SOLUTION INTRAVENOUS CONTINUOUS
Status: DISCONTINUED | OUTPATIENT
Start: 2019-11-29 | End: 2019-11-30

## 2019-11-29 RX ORDER — POTASSIUM CHLORIDE 7.45 MG/ML
10 INJECTION INTRAVENOUS
Status: COMPLETED | OUTPATIENT
Start: 2019-11-29 | End: 2019-11-29

## 2019-11-29 RX ADMIN — POTASSIUM CHLORIDE 10 MEQ: 7.46 INJECTION, SOLUTION INTRAVENOUS at 12:31

## 2019-11-29 RX ADMIN — LEVETIRACETAM 250 MG: 100 INJECTION, SOLUTION INTRAVENOUS at 05:16

## 2019-11-29 RX ADMIN — SODIUM CHLORIDE: 9 INJECTION, SOLUTION INTRAVENOUS at 10:08

## 2019-11-29 RX ADMIN — POTASSIUM CHLORIDE 10 MEQ: 7.46 INJECTION, SOLUTION INTRAVENOUS at 14:24

## 2019-11-29 RX ADMIN — Medication 10 ML: at 17:15

## 2019-11-29 RX ADMIN — LEVETIRACETAM 250 MG: 100 INJECTION, SOLUTION INTRAVENOUS at 16:42

## 2019-11-29 RX ADMIN — POTASSIUM CHLORIDE 10 MEQ: 7.46 INJECTION, SOLUTION INTRAVENOUS at 13:26

## 2019-11-29 RX ADMIN — POTASSIUM CHLORIDE 10 MEQ: 7.46 INJECTION, SOLUTION INTRAVENOUS at 17:13

## 2019-11-29 RX ADMIN — CEFTRIAXONE SODIUM 1 G: 1 INJECTION, POWDER, FOR SOLUTION INTRAMUSCULAR; INTRAVENOUS at 18:18

## 2019-11-29 RX ADMIN — SODIUM CHLORIDE: 9 INJECTION, SOLUTION INTRAVENOUS at 23:46

## 2019-11-29 RX ADMIN — SODIUM BICARBONATE: 84 INJECTION, SOLUTION INTRAVENOUS at 07:37

## 2019-11-29 RX ADMIN — Medication 10 ML: at 10:09

## 2019-11-29 ASSESSMENT — PAIN SCALES - GENERAL: PAINLEVEL_OUTOF10: 0

## 2019-11-30 LAB
ANION GAP SERPL CALCULATED.3IONS-SCNC: 16 MEQ/L (ref 8–16)
BASOPHILS # BLD: 0.4 %
BASOPHILS ABSOLUTE: 0 THOU/MM3 (ref 0–0.1)
BUN BLDV-MCNC: 36 MG/DL (ref 7–22)
CALCIUM SERPL-MCNC: 7.8 MG/DL (ref 8.5–10.5)
CHLORIDE BLD-SCNC: 119 MEQ/L (ref 98–111)
CO2: 20 MEQ/L (ref 23–33)
CREAT SERPL-MCNC: 0.9 MG/DL (ref 0.4–1.2)
EOSINOPHIL # BLD: 0.7 %
EOSINOPHILS ABSOLUTE: 0.1 THOU/MM3 (ref 0–0.4)
ERYTHROCYTE [DISTWIDTH] IN BLOOD BY AUTOMATED COUNT: 14.1 % (ref 11.5–14.5)
ERYTHROCYTE [DISTWIDTH] IN BLOOD BY AUTOMATED COUNT: 48.2 FL (ref 35–45)
GFR SERPL CREATININE-BSD FRML MDRD: 60 ML/MIN/1.73M2
GLUCOSE BLD-MCNC: 96 MG/DL (ref 70–108)
HCT VFR BLD CALC: 37.8 % (ref 37–47)
HEMOGLOBIN: 12.1 GM/DL (ref 12–16)
IMMATURE GRANS (ABS): 0.1 THOU/MM3 (ref 0–0.07)
IMMATURE GRANULOCYTES: 1.2 %
LYMPHOCYTES # BLD: 24.2 %
LYMPHOCYTES ABSOLUTE: 2 THOU/MM3 (ref 1–4.8)
MAGNESIUM: 1.6 MG/DL (ref 1.6–2.4)
MCH RBC QN AUTO: 30 PG (ref 26–33)
MCHC RBC AUTO-ENTMCNC: 32 GM/DL (ref 32.2–35.5)
MCV RBC AUTO: 93.6 FL (ref 81–99)
MONOCYTES # BLD: 9.6 %
MONOCYTES ABSOLUTE: 0.8 THOU/MM3 (ref 0.4–1.3)
NUCLEATED RED BLOOD CELLS: 0 /100 WBC
PLATELET # BLD: 140 THOU/MM3 (ref 130–400)
PMV BLD AUTO: 11 FL (ref 9.4–12.4)
POTASSIUM SERPL-SCNC: 3.1 MEQ/L (ref 3.5–5.2)
POTASSIUM SERPL-SCNC: 3.3 MEQ/L (ref 3.5–5.2)
POTASSIUM SERPL-SCNC: 5 MEQ/L (ref 3.5–5.2)
RBC # BLD: 4.04 MILL/MM3 (ref 4.2–5.4)
SEG NEUTROPHILS: 63.9 %
SEGMENTED NEUTROPHILS ABSOLUTE COUNT: 5.2 THOU/MM3 (ref 1.8–7.7)
SODIUM BLD-SCNC: 155 MEQ/L (ref 135–145)
VDRL CSF SCREEN: NONREACTIVE
WBC # BLD: 8.1 THOU/MM3 (ref 4.8–10.8)

## 2019-11-30 PROCEDURE — 2580000003 HC RX 258: Performed by: PSYCHIATRY & NEUROLOGY

## 2019-11-30 PROCEDURE — 6360000002 HC RX W HCPCS: Performed by: PSYCHIATRY & NEUROLOGY

## 2019-11-30 PROCEDURE — 80048 BASIC METABOLIC PNL TOTAL CA: CPT

## 2019-11-30 PROCEDURE — 2580000003 HC RX 258: Performed by: INTERNAL MEDICINE

## 2019-11-30 PROCEDURE — 83735 ASSAY OF MAGNESIUM: CPT

## 2019-11-30 PROCEDURE — 2580000003 HC RX 258: Performed by: HOSPITALIST

## 2019-11-30 PROCEDURE — 2140000000 HC CCU INTERMEDIATE R&B

## 2019-11-30 PROCEDURE — 36415 COLL VENOUS BLD VENIPUNCTURE: CPT

## 2019-11-30 PROCEDURE — 99232 SBSQ HOSP IP/OBS MODERATE 35: CPT | Performed by: PSYCHIATRY & NEUROLOGY

## 2019-11-30 PROCEDURE — 6360000002 HC RX W HCPCS: Performed by: HOSPITALIST

## 2019-11-30 PROCEDURE — 84132 ASSAY OF SERUM POTASSIUM: CPT

## 2019-11-30 PROCEDURE — 85025 COMPLETE CBC W/AUTO DIFF WBC: CPT

## 2019-11-30 PROCEDURE — 2709999900 HC NON-CHARGEABLE SUPPLY

## 2019-11-30 PROCEDURE — 99233 SBSQ HOSP IP/OBS HIGH 50: CPT | Performed by: INTERNAL MEDICINE

## 2019-11-30 PROCEDURE — 95816 EEG AWAKE AND DROWSY: CPT | Performed by: PSYCHIATRY & NEUROLOGY

## 2019-11-30 RX ORDER — POTASSIUM CHLORIDE 7.45 MG/ML
10 INJECTION INTRAVENOUS
Status: COMPLETED | OUTPATIENT
Start: 2019-11-30 | End: 2019-11-30

## 2019-11-30 RX ORDER — DEXTROSE AND SODIUM CHLORIDE 5; .45 G/100ML; G/100ML
INJECTION, SOLUTION INTRAVENOUS CONTINUOUS
Status: DISCONTINUED | OUTPATIENT
Start: 2019-11-30 | End: 2019-12-01

## 2019-11-30 RX ADMIN — DEXTROSE AND SODIUM CHLORIDE: 5; 450 INJECTION, SOLUTION INTRAVENOUS at 11:11

## 2019-11-30 RX ADMIN — DEXTROSE AND SODIUM CHLORIDE: 5; 450 INJECTION, SOLUTION INTRAVENOUS at 17:49

## 2019-11-30 RX ADMIN — POTASSIUM CHLORIDE 10 MEQ: 7.46 INJECTION, SOLUTION INTRAVENOUS at 08:11

## 2019-11-30 RX ADMIN — POTASSIUM CHLORIDE 10 MEQ: 7.46 INJECTION, SOLUTION INTRAVENOUS at 03:02

## 2019-11-30 RX ADMIN — LEVETIRACETAM 500 MG: 100 INJECTION, SOLUTION INTRAVENOUS at 17:15

## 2019-11-30 RX ADMIN — CEFTRIAXONE SODIUM 1 G: 1 INJECTION, POWDER, FOR SOLUTION INTRAMUSCULAR; INTRAVENOUS at 17:49

## 2019-11-30 RX ADMIN — POTASSIUM CHLORIDE 10 MEQ: 7.46 INJECTION, SOLUTION INTRAVENOUS at 06:17

## 2019-11-30 RX ADMIN — POTASSIUM CHLORIDE 10 MEQ: 7.46 INJECTION, SOLUTION INTRAVENOUS at 04:15

## 2019-11-30 RX ADMIN — LEVETIRACETAM 250 MG: 100 INJECTION, SOLUTION INTRAVENOUS at 05:24

## 2019-11-30 ASSESSMENT — PAIN SCALES - GENERAL
PAINLEVEL_OUTOF10: 0

## 2019-12-01 ENCOUNTER — APPOINTMENT (OUTPATIENT)
Dept: GENERAL RADIOLOGY | Age: 79
DRG: 871 | End: 2019-12-01
Payer: MEDICARE

## 2019-12-01 LAB
ANION GAP SERPL CALCULATED.3IONS-SCNC: 15 MEQ/L (ref 8–16)
BASOPHILS # BLD: 0.3 %
BASOPHILS ABSOLUTE: 0 THOU/MM3 (ref 0–0.1)
BUN BLDV-MCNC: 18 MG/DL (ref 7–22)
CALCIUM SERPL-MCNC: 7.3 MG/DL (ref 8.5–10.5)
CHLORIDE BLD-SCNC: 111 MEQ/L (ref 98–111)
CO2: 19 MEQ/L (ref 23–33)
CREAT SERPL-MCNC: 0.7 MG/DL (ref 0.4–1.2)
EOSINOPHIL # BLD: 1.7 %
EOSINOPHILS ABSOLUTE: 0.2 THOU/MM3 (ref 0–0.4)
ERYTHROCYTE [DISTWIDTH] IN BLOOD BY AUTOMATED COUNT: 13.9 % (ref 11.5–14.5)
ERYTHROCYTE [DISTWIDTH] IN BLOOD BY AUTOMATED COUNT: 48.4 FL (ref 35–45)
GFR SERPL CREATININE-BSD FRML MDRD: 81 ML/MIN/1.73M2
GLUCOSE BLD-MCNC: 170 MG/DL (ref 70–108)
HCT VFR BLD CALC: 37.9 % (ref 37–47)
HEMOGLOBIN: 12.1 GM/DL (ref 12–16)
IMMATURE GRANS (ABS): 0.15 THOU/MM3 (ref 0–0.07)
IMMATURE GRANULOCYTES: 1.5 %
LYMPHOCYTES # BLD: 26.3 %
LYMPHOCYTES ABSOLUTE: 2.6 THOU/MM3 (ref 1–4.8)
MAGNESIUM: 1.2 MG/DL (ref 1.6–2.4)
MAGNESIUM: 1.3 MG/DL (ref 1.6–2.4)
MCH RBC QN AUTO: 30.4 PG (ref 26–33)
MCHC RBC AUTO-ENTMCNC: 31.9 GM/DL (ref 32.2–35.5)
MCV RBC AUTO: 95.2 FL (ref 81–99)
MONOCYTES # BLD: 8.1 %
MONOCYTES ABSOLUTE: 0.8 THOU/MM3 (ref 0.4–1.3)
NUCLEATED RED BLOOD CELLS: 0 /100 WBC
PHOSPHORUS: 1.3 MG/DL (ref 2.4–4.7)
PLATELET # BLD: 130 THOU/MM3 (ref 130–400)
PMV BLD AUTO: 11.2 FL (ref 9.4–12.4)
POTASSIUM SERPL-SCNC: 2.9 MEQ/L (ref 3.5–5.2)
POTASSIUM SERPL-SCNC: 3.9 MEQ/L (ref 3.5–5.2)
RBC # BLD: 3.98 MILL/MM3 (ref 4.2–5.4)
SEG NEUTROPHILS: 62.1 %
SEGMENTED NEUTROPHILS ABSOLUTE COUNT: 6.1 THOU/MM3 (ref 1.8–7.7)
SODIUM BLD-SCNC: 145 MEQ/L (ref 135–145)
STREP PNEUMO AG, UR: NEGATIVE
WBC # BLD: 9.8 THOU/MM3 (ref 4.8–10.8)

## 2019-12-01 PROCEDURE — 2580000003 HC RX 258: Performed by: PSYCHIATRY & NEUROLOGY

## 2019-12-01 PROCEDURE — 2709999900 HC NON-CHARGEABLE SUPPLY

## 2019-12-01 PROCEDURE — 6360000002 HC RX W HCPCS: Performed by: PSYCHIATRY & NEUROLOGY

## 2019-12-01 PROCEDURE — 6360000002 HC RX W HCPCS: Performed by: HOSPITALIST

## 2019-12-01 PROCEDURE — 99233 SBSQ HOSP IP/OBS HIGH 50: CPT | Performed by: INTERNAL MEDICINE

## 2019-12-01 PROCEDURE — 99232 SBSQ HOSP IP/OBS MODERATE 35: CPT | Performed by: PSYCHIATRY & NEUROLOGY

## 2019-12-01 PROCEDURE — 2140000000 HC CCU INTERMEDIATE R&B

## 2019-12-01 PROCEDURE — 2500000003 HC RX 250 WO HCPCS: Performed by: INTERNAL MEDICINE

## 2019-12-01 PROCEDURE — 36415 COLL VENOUS BLD VENIPUNCTURE: CPT

## 2019-12-01 PROCEDURE — 94760 N-INVAS EAR/PLS OXIMETRY 1: CPT

## 2019-12-01 PROCEDURE — 80048 BASIC METABOLIC PNL TOTAL CA: CPT

## 2019-12-01 PROCEDURE — 71045 X-RAY EXAM CHEST 1 VIEW: CPT

## 2019-12-01 PROCEDURE — 2580000003 HC RX 258: Performed by: HOSPITALIST

## 2019-12-01 PROCEDURE — 83735 ASSAY OF MAGNESIUM: CPT

## 2019-12-01 PROCEDURE — 84132 ASSAY OF SERUM POTASSIUM: CPT

## 2019-12-01 PROCEDURE — 84100 ASSAY OF PHOSPHORUS: CPT

## 2019-12-01 PROCEDURE — 2580000003 HC RX 258: Performed by: INTERNAL MEDICINE

## 2019-12-01 PROCEDURE — 6360000002 HC RX W HCPCS: Performed by: INTERNAL MEDICINE

## 2019-12-01 PROCEDURE — 85025 COMPLETE CBC W/AUTO DIFF WBC: CPT

## 2019-12-01 RX ORDER — POTASSIUM CHLORIDE 7.45 MG/ML
10 INJECTION INTRAVENOUS
Status: COMPLETED | OUTPATIENT
Start: 2019-12-01 | End: 2019-12-01

## 2019-12-01 RX ORDER — MAGNESIUM SULFATE IN WATER 40 MG/ML
4 INJECTION, SOLUTION INTRAVENOUS ONCE
Status: COMPLETED | OUTPATIENT
Start: 2019-12-01 | End: 2019-12-01

## 2019-12-01 RX ADMIN — POTASSIUM CHLORIDE 10 MEQ: 7.46 INJECTION, SOLUTION INTRAVENOUS at 09:13

## 2019-12-01 RX ADMIN — MAGNESIUM SULFATE HEPTAHYDRATE 4 G: 40 INJECTION, SOLUTION INTRAVENOUS at 05:33

## 2019-12-01 RX ADMIN — LEVETIRACETAM 500 MG: 100 INJECTION, SOLUTION INTRAVENOUS at 16:38

## 2019-12-01 RX ADMIN — DEXTROSE AND SODIUM CHLORIDE: 5; 450 INJECTION, SOLUTION INTRAVENOUS at 01:02

## 2019-12-01 RX ADMIN — POTASSIUM CHLORIDE 10 MEQ: 7.46 INJECTION, SOLUTION INTRAVENOUS at 13:24

## 2019-12-01 RX ADMIN — POTASSIUM CHLORIDE 10 MEQ: 7.46 INJECTION, SOLUTION INTRAVENOUS at 10:58

## 2019-12-01 RX ADMIN — DEXTROSE AND SODIUM CHLORIDE: 5; 450 INJECTION, SOLUTION INTRAVENOUS at 09:13

## 2019-12-01 RX ADMIN — POTASSIUM CHLORIDE 10 MEQ: 7.46 INJECTION, SOLUTION INTRAVENOUS at 14:41

## 2019-12-01 RX ADMIN — LEVETIRACETAM 500 MG: 100 INJECTION, SOLUTION INTRAVENOUS at 04:35

## 2019-12-01 RX ADMIN — POTASSIUM PHOSPHATE, MONOBASIC AND POTASSIUM PHOSPHATE, DIBASIC 15 MMOL: 224; 236 INJECTION, SOLUTION INTRAVENOUS at 09:13

## 2019-12-01 RX ADMIN — POTASSIUM CHLORIDE 10 MEQ: 7.46 INJECTION, SOLUTION INTRAVENOUS at 11:59

## 2019-12-01 RX ADMIN — POTASSIUM CHLORIDE 10 MEQ: 7.46 INJECTION, SOLUTION INTRAVENOUS at 05:33

## 2019-12-01 RX ADMIN — CEFTRIAXONE SODIUM 1 G: 1 INJECTION, POWDER, FOR SOLUTION INTRAMUSCULAR; INTRAVENOUS at 16:57

## 2019-12-02 PROBLEM — E44.0 MODERATE MALNUTRITION (HCC): Status: ACTIVE | Noted: 2019-12-02

## 2019-12-02 LAB
ANION GAP SERPL CALCULATED.3IONS-SCNC: 15 MEQ/L (ref 8–16)
BASOPHILS # BLD: 0.3 %
BASOPHILS ABSOLUTE: 0 THOU/MM3 (ref 0–0.1)
BUN BLDV-MCNC: 10 MG/DL (ref 7–22)
CALCIUM SERPL-MCNC: 7.1 MG/DL (ref 8.5–10.5)
CHLORIDE BLD-SCNC: 105 MEQ/L (ref 98–111)
CO2: 18 MEQ/L (ref 23–33)
CREAT SERPL-MCNC: 0.7 MG/DL (ref 0.4–1.2)
EOSINOPHIL # BLD: 1.6 %
EOSINOPHILS ABSOLUTE: 0.2 THOU/MM3 (ref 0–0.4)
EPSTEIN BARR VIRUS BY PCR (BLOOD): NORMAL
ERYTHROCYTE [DISTWIDTH] IN BLOOD BY AUTOMATED COUNT: 13.8 % (ref 11.5–14.5)
ERYTHROCYTE [DISTWIDTH] IN BLOOD BY AUTOMATED COUNT: 45.9 FL (ref 35–45)
GFR SERPL CREATININE-BSD FRML MDRD: 81 ML/MIN/1.73M2
GLUCOSE BLD-MCNC: 140 MG/DL (ref 70–108)
HCT VFR BLD CALC: 41.4 % (ref 37–47)
HEMOGLOBIN: 13.5 GM/DL (ref 12–16)
IMMATURE GRANS (ABS): 0.13 THOU/MM3 (ref 0–0.07)
IMMATURE GRANULOCYTES: 1.1 %
LYMPHOCYTES # BLD: 17.7 %
LYMPHOCYTES ABSOLUTE: 2.1 THOU/MM3 (ref 1–4.8)
MAGNESIUM: 1.6 MG/DL (ref 1.6–2.4)
MCH RBC QN AUTO: 29.8 PG (ref 26–33)
MCHC RBC AUTO-ENTMCNC: 32.6 GM/DL (ref 32.2–35.5)
MCV RBC AUTO: 91.4 FL (ref 81–99)
MONOCYTES # BLD: 6.1 %
MONOCYTES ABSOLUTE: 0.7 THOU/MM3 (ref 0.4–1.3)
NUCLEATED RED BLOOD CELLS: 0 /100 WBC
PHOSPHORUS: 1.9 MG/DL (ref 2.4–4.7)
PLATELET # BLD: 133 THOU/MM3 (ref 130–400)
PMV BLD AUTO: 11.1 FL (ref 9.4–12.4)
POTASSIUM SERPL-SCNC: 2.8 MEQ/L (ref 3.5–5.2)
POTASSIUM SERPL-SCNC: 3.8 MEQ/L (ref 3.5–5.2)
RBC # BLD: 4.53 MILL/MM3 (ref 4.2–5.4)
SEG NEUTROPHILS: 73.2 %
SEGMENTED NEUTROPHILS ABSOLUTE COUNT: 8.5 THOU/MM3 (ref 1.8–7.7)
SODIUM BLD-SCNC: 138 MEQ/L (ref 135–145)
WBC # BLD: 11.6 THOU/MM3 (ref 4.8–10.8)

## 2019-12-02 PROCEDURE — 36415 COLL VENOUS BLD VENIPUNCTURE: CPT

## 2019-12-02 PROCEDURE — 2140000000 HC CCU INTERMEDIATE R&B

## 2019-12-02 PROCEDURE — 2580000003 HC RX 258: Performed by: INTERNAL MEDICINE

## 2019-12-02 PROCEDURE — 84132 ASSAY OF SERUM POTASSIUM: CPT

## 2019-12-02 PROCEDURE — 99222 1ST HOSP IP/OBS MODERATE 55: CPT | Performed by: INTERNAL MEDICINE

## 2019-12-02 PROCEDURE — 2580000003 HC RX 258: Performed by: HOSPITALIST

## 2019-12-02 PROCEDURE — 6360000002 HC RX W HCPCS: Performed by: INTERNAL MEDICINE

## 2019-12-02 PROCEDURE — 6360000002 HC RX W HCPCS: Performed by: PSYCHIATRY & NEUROLOGY

## 2019-12-02 PROCEDURE — 83735 ASSAY OF MAGNESIUM: CPT

## 2019-12-02 PROCEDURE — 6370000000 HC RX 637 (ALT 250 FOR IP): Performed by: PSYCHIATRY & NEUROLOGY

## 2019-12-02 PROCEDURE — 85025 COMPLETE CBC W/AUTO DIFF WBC: CPT

## 2019-12-02 PROCEDURE — 6370000000 HC RX 637 (ALT 250 FOR IP): Performed by: FAMILY MEDICINE

## 2019-12-02 PROCEDURE — 2580000003 HC RX 258: Performed by: PSYCHIATRY & NEUROLOGY

## 2019-12-02 PROCEDURE — 80048 BASIC METABOLIC PNL TOTAL CA: CPT

## 2019-12-02 PROCEDURE — 2500000003 HC RX 250 WO HCPCS: Performed by: INTERNAL MEDICINE

## 2019-12-02 PROCEDURE — 99233 SBSQ HOSP IP/OBS HIGH 50: CPT | Performed by: INTERNAL MEDICINE

## 2019-12-02 PROCEDURE — 84100 ASSAY OF PHOSPHORUS: CPT

## 2019-12-02 PROCEDURE — 2709999900 HC NON-CHARGEABLE SUPPLY

## 2019-12-02 PROCEDURE — 6360000002 HC RX W HCPCS: Performed by: HOSPITALIST

## 2019-12-02 PROCEDURE — 2580000003 HC RX 258: Performed by: FAMILY MEDICINE

## 2019-12-02 RX ORDER — POTASSIUM CHLORIDE 7.45 MG/ML
10 INJECTION INTRAVENOUS
Status: COMPLETED | OUTPATIENT
Start: 2019-12-02 | End: 2019-12-02

## 2019-12-02 RX ORDER — DEXTROSE AND SODIUM CHLORIDE 5; .45 G/100ML; G/100ML
INJECTION, SOLUTION INTRAVENOUS CONTINUOUS
Status: DISCONTINUED | OUTPATIENT
Start: 2019-12-02 | End: 2019-12-02

## 2019-12-02 RX ORDER — MAGNESIUM SULFATE IN WATER 40 MG/ML
2 INJECTION, SOLUTION INTRAVENOUS ONCE
Status: COMPLETED | OUTPATIENT
Start: 2019-12-02 | End: 2019-12-02

## 2019-12-02 RX ADMIN — Medication 1000 MCG: at 09:41

## 2019-12-02 RX ADMIN — Medication 10 ML: at 05:06

## 2019-12-02 RX ADMIN — POTASSIUM CHLORIDE 10 MEQ: 7.46 INJECTION, SOLUTION INTRAVENOUS at 23:20

## 2019-12-02 RX ADMIN — MAGNESIUM SULFATE HEPTAHYDRATE 2 G: 40 INJECTION, SOLUTION INTRAVENOUS at 06:04

## 2019-12-02 RX ADMIN — METOPROLOL TARTRATE 12.5 MG: 25 TABLET ORAL at 22:10

## 2019-12-02 RX ADMIN — CEFTRIAXONE SODIUM 1 G: 1 INJECTION, POWDER, FOR SOLUTION INTRAMUSCULAR; INTRAVENOUS at 17:56

## 2019-12-02 RX ADMIN — POTASSIUM CHLORIDE 10 MEQ: 7.46 INJECTION, SOLUTION INTRAVENOUS at 13:16

## 2019-12-02 RX ADMIN — POTASSIUM PHOSPHATE, MONOBASIC AND POTASSIUM PHOSPHATE, DIBASIC 15 MMOL: 224; 236 INJECTION, SOLUTION INTRAVENOUS at 11:39

## 2019-12-02 RX ADMIN — LEVETIRACETAM 500 MG: 100 INJECTION, SOLUTION INTRAVENOUS at 05:01

## 2019-12-02 RX ADMIN — POTASSIUM CHLORIDE 10 MEQ: 7.46 INJECTION, SOLUTION INTRAVENOUS at 15:32

## 2019-12-02 RX ADMIN — POTASSIUM CHLORIDE 10 MEQ: 7.46 INJECTION, SOLUTION INTRAVENOUS at 05:44

## 2019-12-02 RX ADMIN — METOPROLOL TARTRATE 12.5 MG: 25 TABLET ORAL at 09:41

## 2019-12-02 RX ADMIN — POTASSIUM CHLORIDE 10 MEQ: 7.46 INJECTION, SOLUTION INTRAVENOUS at 11:39

## 2019-12-02 RX ADMIN — LEVETIRACETAM 500 MG: 100 INJECTION, SOLUTION INTRAVENOUS at 17:46

## 2019-12-02 RX ADMIN — POTASSIUM CHLORIDE 10 MEQ: 7.46 INJECTION, SOLUTION INTRAVENOUS at 06:52

## 2019-12-02 RX ADMIN — POTASSIUM CHLORIDE 10 MEQ: 7.46 INJECTION, SOLUTION INTRAVENOUS at 21:06

## 2019-12-02 RX ADMIN — POTASSIUM CHLORIDE 10 MEQ: 7.46 INJECTION, SOLUTION INTRAVENOUS at 08:11

## 2019-12-03 ENCOUNTER — APPOINTMENT (OUTPATIENT)
Dept: GENERAL RADIOLOGY | Age: 79
DRG: 871 | End: 2019-12-03
Payer: MEDICARE

## 2019-12-03 LAB
ANION GAP SERPL CALCULATED.3IONS-SCNC: 15 MEQ/L (ref 8–16)
BASOPHILS # BLD: 0.3 %
BASOPHILS ABSOLUTE: 0 THOU/MM3 (ref 0–0.1)
BLOOD CULTURE, ROUTINE: NORMAL
BLOOD CULTURE, ROUTINE: NORMAL
BUN BLDV-MCNC: 12 MG/DL (ref 7–22)
CALCIUM SERPL-MCNC: 7.2 MG/DL (ref 8.5–10.5)
CHLORIDE BLD-SCNC: 103 MEQ/L (ref 98–111)
CO2: 18 MEQ/L (ref 23–33)
CREAT SERPL-MCNC: 0.5 MG/DL (ref 0.4–1.2)
EOSINOPHIL # BLD: 1.8 %
EOSINOPHILS ABSOLUTE: 0.2 THOU/MM3 (ref 0–0.4)
ERYTHROCYTE [DISTWIDTH] IN BLOOD BY AUTOMATED COUNT: 14.5 % (ref 11.5–14.5)
ERYTHROCYTE [DISTWIDTH] IN BLOOD BY AUTOMATED COUNT: 49.3 FL (ref 35–45)
GFR SERPL CREATININE-BSD FRML MDRD: > 90 ML/MIN/1.73M2
GLUCOSE BLD-MCNC: 149 MG/DL (ref 70–108)
HCT VFR BLD CALC: 39.4 % (ref 37–47)
HEMOGLOBIN: 12.4 GM/DL (ref 12–16)
IMMATURE GRANS (ABS): 0.18 THOU/MM3 (ref 0–0.07)
IMMATURE GRANULOCYTES: 1.8 %
LYMPHOCYTES # BLD: 19.1 %
LYMPHOCYTES ABSOLUTE: 2 THOU/MM3 (ref 1–4.8)
MAGNESIUM: 1.6 MG/DL (ref 1.6–2.4)
MCH RBC QN AUTO: 29.6 PG (ref 26–33)
MCHC RBC AUTO-ENTMCNC: 31.5 GM/DL (ref 32.2–35.5)
MCV RBC AUTO: 94 FL (ref 81–99)
MONOCYTES # BLD: 8.3 %
MONOCYTES ABSOLUTE: 0.9 THOU/MM3 (ref 0.4–1.3)
NUCLEATED RED BLOOD CELLS: 0 /100 WBC
PHOSPHORUS: 2 MG/DL (ref 2.4–4.7)
PLATELET # BLD: 123 THOU/MM3 (ref 130–400)
PMV BLD AUTO: 11.7 FL (ref 9.4–12.4)
POTASSIUM SERPL-SCNC: 3.8 MEQ/L (ref 3.5–5.2)
POTASSIUM SERPL-SCNC: 4.4 MEQ/L (ref 3.5–5.2)
RBC # BLD: 4.19 MILL/MM3 (ref 4.2–5.4)
SEG NEUTROPHILS: 68.7 %
SEGMENTED NEUTROPHILS ABSOLUTE COUNT: 7.1 THOU/MM3 (ref 1.8–7.7)
SODIUM BLD-SCNC: 136 MEQ/L (ref 135–145)
WBC # BLD: 10.3 THOU/MM3 (ref 4.8–10.8)

## 2019-12-03 PROCEDURE — 80048 BASIC METABOLIC PNL TOTAL CA: CPT

## 2019-12-03 PROCEDURE — 2580000003 HC RX 258: Performed by: PSYCHIATRY & NEUROLOGY

## 2019-12-03 PROCEDURE — 6370000000 HC RX 637 (ALT 250 FOR IP): Performed by: FAMILY MEDICINE

## 2019-12-03 PROCEDURE — 6360000002 HC RX W HCPCS: Performed by: PSYCHIATRY & NEUROLOGY

## 2019-12-03 PROCEDURE — 36415 COLL VENOUS BLD VENIPUNCTURE: CPT

## 2019-12-03 PROCEDURE — 6360000002 HC RX W HCPCS: Performed by: INTERNAL MEDICINE

## 2019-12-03 PROCEDURE — 2580000003 HC RX 258: Performed by: INTERNAL MEDICINE

## 2019-12-03 PROCEDURE — 85025 COMPLETE CBC W/AUTO DIFF WBC: CPT

## 2019-12-03 PROCEDURE — 92610 EVALUATE SWALLOWING FUNCTION: CPT

## 2019-12-03 PROCEDURE — 71045 X-RAY EXAM CHEST 1 VIEW: CPT

## 2019-12-03 PROCEDURE — 84132 ASSAY OF SERUM POTASSIUM: CPT

## 2019-12-03 PROCEDURE — 97110 THERAPEUTIC EXERCISES: CPT

## 2019-12-03 PROCEDURE — 2709999900 HC NON-CHARGEABLE SUPPLY

## 2019-12-03 PROCEDURE — 99232 SBSQ HOSP IP/OBS MODERATE 35: CPT | Performed by: PHYSICIAN ASSISTANT

## 2019-12-03 PROCEDURE — 6370000000 HC RX 637 (ALT 250 FOR IP): Performed by: PSYCHIATRY & NEUROLOGY

## 2019-12-03 PROCEDURE — 2580000003 HC RX 258: Performed by: FAMILY MEDICINE

## 2019-12-03 PROCEDURE — 2140000000 HC CCU INTERMEDIATE R&B

## 2019-12-03 PROCEDURE — 83735 ASSAY OF MAGNESIUM: CPT

## 2019-12-03 PROCEDURE — 84100 ASSAY OF PHOSPHORUS: CPT

## 2019-12-03 PROCEDURE — 99233 SBSQ HOSP IP/OBS HIGH 50: CPT | Performed by: INTERNAL MEDICINE

## 2019-12-03 PROCEDURE — 2500000003 HC RX 250 WO HCPCS: Performed by: INTERNAL MEDICINE

## 2019-12-03 RX ORDER — MAGNESIUM SULFATE IN WATER 40 MG/ML
2 INJECTION, SOLUTION INTRAVENOUS ONCE
Status: COMPLETED | OUTPATIENT
Start: 2019-12-03 | End: 2019-12-03

## 2019-12-03 RX ORDER — SODIUM CHLORIDE 0.9 % (FLUSH) 0.9 %
10 SYRINGE (ML) INJECTION PRN
Status: DISCONTINUED | OUTPATIENT
Start: 2019-12-03 | End: 2019-12-06 | Stop reason: HOSPADM

## 2019-12-03 RX ORDER — SODIUM CHLORIDE 0.9 % (FLUSH) 0.9 %
10 SYRINGE (ML) INJECTION EVERY 12 HOURS SCHEDULED
Status: DISCONTINUED | OUTPATIENT
Start: 2019-12-03 | End: 2019-12-06 | Stop reason: HOSPADM

## 2019-12-03 RX ORDER — POTASSIUM CHLORIDE 7.45 MG/ML
10 INJECTION INTRAVENOUS
Status: COMPLETED | OUTPATIENT
Start: 2019-12-03 | End: 2019-12-03

## 2019-12-03 RX ORDER — POTASSIUM CHLORIDE 20 MEQ/1
20 TABLET, EXTENDED RELEASE ORAL ONCE
Status: DISCONTINUED | OUTPATIENT
Start: 2019-12-03 | End: 2019-12-03

## 2019-12-03 RX ORDER — LEVETIRACETAM 100 MG/ML
500 SOLUTION ORAL EVERY 12 HOURS
Status: DISCONTINUED | OUTPATIENT
Start: 2019-12-03 | End: 2019-12-06 | Stop reason: HOSPADM

## 2019-12-03 RX ORDER — CEFAZOLIN SODIUM 1 G/50ML
1 INJECTION, SOLUTION INTRAVENOUS
Status: COMPLETED | OUTPATIENT
Start: 2019-12-04 | End: 2019-12-04

## 2019-12-03 RX ADMIN — Medication 10 ML: at 08:33

## 2019-12-03 RX ADMIN — LEVETIRACETAM 500 MG: 500 SOLUTION ORAL at 17:36

## 2019-12-03 RX ADMIN — METOPROLOL TARTRATE 12.5 MG: 25 TABLET ORAL at 12:39

## 2019-12-03 RX ADMIN — POTASSIUM CHLORIDE 10 MEQ: 7.46 INJECTION, SOLUTION INTRAVENOUS at 08:58

## 2019-12-03 RX ADMIN — MAGNESIUM SULFATE HEPTAHYDRATE 2 G: 40 INJECTION, SOLUTION INTRAVENOUS at 06:47

## 2019-12-03 RX ADMIN — POTASSIUM PHOSPHATE, MONOBASIC AND POTASSIUM PHOSPHATE, DIBASIC 9 MMOL: 224; 236 INJECTION, SOLUTION, CONCENTRATE INTRAVENOUS at 08:31

## 2019-12-03 RX ADMIN — LEVETIRACETAM 500 MG: 100 INJECTION, SOLUTION INTRAVENOUS at 05:17

## 2019-12-03 RX ADMIN — POTASSIUM CHLORIDE 10 MEQ: 7.46 INJECTION, SOLUTION INTRAVENOUS at 06:47

## 2019-12-03 RX ADMIN — Medication 1000 MCG: at 08:31

## 2019-12-04 LAB
ANAEROBIC CULTURE: NORMAL
ANION GAP SERPL CALCULATED.3IONS-SCNC: 13 MEQ/L (ref 8–16)
BASOPHILS # BLD: 0.5 %
BASOPHILS ABSOLUTE: 0 THOU/MM3 (ref 0–0.1)
BUN BLDV-MCNC: 13 MG/DL (ref 7–22)
CALCIUM SERPL-MCNC: 7.9 MG/DL (ref 8.5–10.5)
CHLORIDE BLD-SCNC: 107 MEQ/L (ref 98–111)
CO2: 20 MEQ/L (ref 23–33)
CREAT SERPL-MCNC: 0.5 MG/DL (ref 0.4–1.2)
CSF CULTURE: NORMAL
EOSINOPHIL # BLD: 2.3 %
EOSINOPHILS ABSOLUTE: 0.2 THOU/MM3 (ref 0–0.4)
ERYTHROCYTE [DISTWIDTH] IN BLOOD BY AUTOMATED COUNT: 14.5 % (ref 11.5–14.5)
ERYTHROCYTE [DISTWIDTH] IN BLOOD BY AUTOMATED COUNT: 48.5 FL (ref 35–45)
GFR SERPL CREATININE-BSD FRML MDRD: > 90 ML/MIN/1.73M2
GLUCOSE BLD-MCNC: 115 MG/DL (ref 70–108)
GRAM STAIN RESULT: NORMAL
HCT VFR BLD CALC: 39.1 % (ref 37–47)
HEMOGLOBIN: 12.5 GM/DL (ref 12–16)
IMMATURE GRANS (ABS): 0.16 THOU/MM3 (ref 0–0.07)
IMMATURE GRANULOCYTES: 1.8 %
LYMPHOCYTES # BLD: 22.4 %
LYMPHOCYTES ABSOLUTE: 1.9 THOU/MM3 (ref 1–4.8)
MAGNESIUM: 1.8 MG/DL (ref 1.6–2.4)
MCH RBC QN AUTO: 29.6 PG (ref 26–33)
MCHC RBC AUTO-ENTMCNC: 32 GM/DL (ref 32.2–35.5)
MCV RBC AUTO: 92.4 FL (ref 81–99)
MONOCYTES # BLD: 9 %
MONOCYTES ABSOLUTE: 0.8 THOU/MM3 (ref 0.4–1.3)
NUCLEATED RED BLOOD CELLS: 0 /100 WBC
PHOSPHORUS: 2.5 MG/DL (ref 2.4–4.7)
PLATELET # BLD: 125 THOU/MM3 (ref 130–400)
PMV BLD AUTO: 11.9 FL (ref 9.4–12.4)
POTASSIUM SERPL-SCNC: 3.9 MEQ/L (ref 3.5–5.2)
RBC # BLD: 4.23 MILL/MM3 (ref 4.2–5.4)
SEG NEUTROPHILS: 64 %
SEGMENTED NEUTROPHILS ABSOLUTE COUNT: 5.6 THOU/MM3 (ref 1.8–7.7)
SODIUM BLD-SCNC: 140 MEQ/L (ref 135–145)
WBC # BLD: 8.7 THOU/MM3 (ref 4.8–10.8)

## 2019-12-04 PROCEDURE — 6360000002 HC RX W HCPCS: Performed by: NURSE PRACTITIONER

## 2019-12-04 PROCEDURE — 2720000010 HC SURG SUPPLY STERILE: Performed by: INTERNAL MEDICINE

## 2019-12-04 PROCEDURE — 97110 THERAPEUTIC EXERCISES: CPT

## 2019-12-04 PROCEDURE — 99153 MOD SED SAME PHYS/QHP EA: CPT | Performed by: INTERNAL MEDICINE

## 2019-12-04 PROCEDURE — 84100 ASSAY OF PHOSPHORUS: CPT

## 2019-12-04 PROCEDURE — 6360000002 HC RX W HCPCS: Performed by: INTERNAL MEDICINE

## 2019-12-04 PROCEDURE — 85025 COMPLETE CBC W/AUTO DIFF WBC: CPT

## 2019-12-04 PROCEDURE — 80048 BASIC METABOLIC PNL TOTAL CA: CPT

## 2019-12-04 PROCEDURE — 2709999900 HC NON-CHARGEABLE SUPPLY

## 2019-12-04 PROCEDURE — 99232 SBSQ HOSP IP/OBS MODERATE 35: CPT | Performed by: INTERNAL MEDICINE

## 2019-12-04 PROCEDURE — 2580000003 HC RX 258: Performed by: INTERNAL MEDICINE

## 2019-12-04 PROCEDURE — 83735 ASSAY OF MAGNESIUM: CPT

## 2019-12-04 PROCEDURE — 0DH63UZ INSERTION OF FEEDING DEVICE INTO STOMACH, PERCUTANEOUS APPROACH: ICD-10-PCS | Performed by: INTERNAL MEDICINE

## 2019-12-04 PROCEDURE — 3E0G76Z INTRODUCTION OF NUTRITIONAL SUBSTANCE INTO UPPER GI, VIA NATURAL OR ARTIFICIAL OPENING: ICD-10-PCS | Performed by: INTERNAL MEDICINE

## 2019-12-04 PROCEDURE — 36415 COLL VENOUS BLD VENIPUNCTURE: CPT

## 2019-12-04 PROCEDURE — 97535 SELF CARE MNGMENT TRAINING: CPT

## 2019-12-04 PROCEDURE — 2140000000 HC CCU INTERMEDIATE R&B

## 2019-12-04 PROCEDURE — 2580000003 HC RX 258: Performed by: NURSE PRACTITIONER

## 2019-12-04 PROCEDURE — 2709999900 HC NON-CHARGEABLE SUPPLY: Performed by: INTERNAL MEDICINE

## 2019-12-04 PROCEDURE — 99152 MOD SED SAME PHYS/QHP 5/>YRS: CPT | Performed by: INTERNAL MEDICINE

## 2019-12-04 PROCEDURE — 3609013300 HC EGD TUBE PLACEMENT: Performed by: INTERNAL MEDICINE

## 2019-12-04 RX ORDER — FENTANYL CITRATE 50 UG/ML
INJECTION, SOLUTION INTRAMUSCULAR; INTRAVENOUS PRN
Status: DISCONTINUED | OUTPATIENT
Start: 2019-12-04 | End: 2019-12-04 | Stop reason: ALTCHOICE

## 2019-12-04 RX ORDER — SODIUM CHLORIDE 450 MG/100ML
INJECTION, SOLUTION INTRAVENOUS CONTINUOUS
Status: DISCONTINUED | OUTPATIENT
Start: 2019-12-04 | End: 2019-12-05

## 2019-12-04 RX ORDER — MAGNESIUM SULFATE IN WATER 40 MG/ML
2 INJECTION, SOLUTION INTRAVENOUS ONCE
Status: COMPLETED | OUTPATIENT
Start: 2019-12-04 | End: 2019-12-04

## 2019-12-04 RX ORDER — POTASSIUM CHLORIDE 7.45 MG/ML
10 INJECTION INTRAVENOUS
Status: COMPLETED | OUTPATIENT
Start: 2019-12-04 | End: 2019-12-04

## 2019-12-04 RX ORDER — MIDAZOLAM HYDROCHLORIDE 1 MG/ML
INJECTION INTRAMUSCULAR; INTRAVENOUS PRN
Status: DISCONTINUED | OUTPATIENT
Start: 2019-12-04 | End: 2019-12-04 | Stop reason: ALTCHOICE

## 2019-12-04 RX ADMIN — POTASSIUM CHLORIDE 10 MEQ: 7.46 INJECTION, SOLUTION INTRAVENOUS at 11:07

## 2019-12-04 RX ADMIN — POTASSIUM CHLORIDE 10 MEQ: 7.46 INJECTION, SOLUTION INTRAVENOUS at 14:47

## 2019-12-04 RX ADMIN — SODIUM CHLORIDE: 4.5 INJECTION, SOLUTION INTRAVENOUS at 18:31

## 2019-12-04 RX ADMIN — CEFAZOLIN SODIUM 1 G: 1 INJECTION, SOLUTION INTRAVENOUS at 17:24

## 2019-12-04 RX ADMIN — MAGNESIUM SULFATE HEPTAHYDRATE 2 G: 40 INJECTION, SOLUTION INTRAVENOUS at 09:00

## 2019-12-04 RX ADMIN — SODIUM CHLORIDE, PRESERVATIVE FREE 10 ML: 5 INJECTION INTRAVENOUS at 08:48

## 2019-12-04 ASSESSMENT — PAIN SCALES - GENERAL: PAINLEVEL_OUTOF10: 0

## 2019-12-04 ASSESSMENT — PAIN SCALES - WONG BAKER: WONGBAKER_NUMERICALRESPONSE: 2

## 2019-12-04 ASSESSMENT — PAIN - FUNCTIONAL ASSESSMENT: PAIN_FUNCTIONAL_ASSESSMENT: 0-10

## 2019-12-05 LAB
ANION GAP SERPL CALCULATED.3IONS-SCNC: 12 MEQ/L (ref 8–16)
BASOPHILS # BLD: 0.6 %
BASOPHILS ABSOLUTE: 0.1 THOU/MM3 (ref 0–0.1)
BUN BLDV-MCNC: 10 MG/DL (ref 7–22)
CALCIUM SERPL-MCNC: 8.2 MG/DL (ref 8.5–10.5)
CHLORIDE BLD-SCNC: 103 MEQ/L (ref 98–111)
CO2: 20 MEQ/L (ref 23–33)
CREAT SERPL-MCNC: 0.5 MG/DL (ref 0.4–1.2)
EOSINOPHIL # BLD: 2.3 %
EOSINOPHILS ABSOLUTE: 0.2 THOU/MM3 (ref 0–0.4)
ERYTHROCYTE [DISTWIDTH] IN BLOOD BY AUTOMATED COUNT: 14.6 % (ref 11.5–14.5)
ERYTHROCYTE [DISTWIDTH] IN BLOOD BY AUTOMATED COUNT: 50.8 FL (ref 35–45)
GFR SERPL CREATININE-BSD FRML MDRD: > 90 ML/MIN/1.73M2
GLUCOSE BLD-MCNC: 92 MG/DL (ref 70–108)
HCT VFR BLD CALC: 42.8 % (ref 37–47)
HEMOGLOBIN: 13.5 GM/DL (ref 12–16)
IMMATURE GRANS (ABS): 0.1 THOU/MM3 (ref 0–0.07)
IMMATURE GRANULOCYTES: 1.2 %
LYMPHOCYTES # BLD: 22.1 %
LYMPHOCYTES ABSOLUTE: 1.9 THOU/MM3 (ref 1–4.8)
MAGNESIUM: 1.9 MG/DL (ref 1.6–2.4)
MCH RBC QN AUTO: 30.2 PG (ref 26–33)
MCHC RBC AUTO-ENTMCNC: 31.5 GM/DL (ref 32.2–35.5)
MCV RBC AUTO: 95.7 FL (ref 81–99)
MONOCYTES # BLD: 9.6 %
MONOCYTES ABSOLUTE: 0.8 THOU/MM3 (ref 0.4–1.3)
NUCLEATED RED BLOOD CELLS: 0 /100 WBC
PHOSPHORUS: 2.5 MG/DL (ref 2.4–4.7)
PLATELET # BLD: 146 THOU/MM3 (ref 130–400)
PMV BLD AUTO: 11.9 FL (ref 9.4–12.4)
POTASSIUM SERPL-SCNC: 3.8 MEQ/L (ref 3.5–5.2)
POTASSIUM SERPL-SCNC: 4.5 MEQ/L (ref 3.5–5.2)
RBC # BLD: 4.47 MILL/MM3 (ref 4.2–5.4)
SEG NEUTROPHILS: 64.2 %
SEGMENTED NEUTROPHILS ABSOLUTE COUNT: 5.4 THOU/MM3 (ref 1.8–7.7)
SODIUM BLD-SCNC: 135 MEQ/L (ref 135–145)
WBC # BLD: 8.4 THOU/MM3 (ref 4.8–10.8)

## 2019-12-05 PROCEDURE — 94760 N-INVAS EAR/PLS OXIMETRY 1: CPT

## 2019-12-05 PROCEDURE — 84132 ASSAY OF SERUM POTASSIUM: CPT

## 2019-12-05 PROCEDURE — 2709999900 HC NON-CHARGEABLE SUPPLY

## 2019-12-05 PROCEDURE — 6360000002 HC RX W HCPCS: Performed by: INTERNAL MEDICINE

## 2019-12-05 PROCEDURE — 6370000000 HC RX 637 (ALT 250 FOR IP): Performed by: FAMILY MEDICINE

## 2019-12-05 PROCEDURE — 6370000000 HC RX 637 (ALT 250 FOR IP): Performed by: PSYCHIATRY & NEUROLOGY

## 2019-12-05 PROCEDURE — 80048 BASIC METABOLIC PNL TOTAL CA: CPT

## 2019-12-05 PROCEDURE — 84100 ASSAY OF PHOSPHORUS: CPT

## 2019-12-05 PROCEDURE — 2580000003 HC RX 258: Performed by: NURSE PRACTITIONER

## 2019-12-05 PROCEDURE — 85025 COMPLETE CBC W/AUTO DIFF WBC: CPT

## 2019-12-05 PROCEDURE — 99232 SBSQ HOSP IP/OBS MODERATE 35: CPT | Performed by: INTERNAL MEDICINE

## 2019-12-05 PROCEDURE — 36415 COLL VENOUS BLD VENIPUNCTURE: CPT

## 2019-12-05 PROCEDURE — 2140000000 HC CCU INTERMEDIATE R&B

## 2019-12-05 PROCEDURE — 2580000003 HC RX 258: Performed by: INTERNAL MEDICINE

## 2019-12-05 PROCEDURE — 83735 ASSAY OF MAGNESIUM: CPT

## 2019-12-05 PROCEDURE — 6370000000 HC RX 637 (ALT 250 FOR IP): Performed by: INTERNAL MEDICINE

## 2019-12-05 RX ORDER — POTASSIUM CHLORIDE 7.45 MG/ML
10 INJECTION INTRAVENOUS
Status: COMPLETED | OUTPATIENT
Start: 2019-12-05 | End: 2019-12-05

## 2019-12-05 RX ADMIN — POTASSIUM BICARBONATE 40 MEQ: 782 TABLET, EFFERVESCENT ORAL at 09:11

## 2019-12-05 RX ADMIN — POTASSIUM CHLORIDE 10 MEQ: 7.46 INJECTION, SOLUTION INTRAVENOUS at 09:04

## 2019-12-05 RX ADMIN — LEVETIRACETAM 500 MG: 500 SOLUTION ORAL at 00:43

## 2019-12-05 RX ADMIN — METOPROLOL TARTRATE 12.5 MG: 25 TABLET ORAL at 09:07

## 2019-12-05 RX ADMIN — SODIUM CHLORIDE: 4.5 INJECTION, SOLUTION INTRAVENOUS at 10:12

## 2019-12-05 RX ADMIN — SODIUM CHLORIDE: 4.5 INJECTION, SOLUTION INTRAVENOUS at 01:02

## 2019-12-05 RX ADMIN — Medication 1000 MCG: at 09:07

## 2019-12-05 RX ADMIN — METOPROLOL TARTRATE 12.5 MG: 25 TABLET ORAL at 00:43

## 2019-12-05 RX ADMIN — LEVETIRACETAM 500 MG: 500 SOLUTION ORAL at 17:34

## 2019-12-05 RX ADMIN — SODIUM CHLORIDE, PRESERVATIVE FREE 10 ML: 5 INJECTION INTRAVENOUS at 09:07

## 2019-12-05 RX ADMIN — LEVETIRACETAM 500 MG: 500 SOLUTION ORAL at 09:07

## 2019-12-05 RX ADMIN — POTASSIUM CHLORIDE 10 MEQ: 7.46 INJECTION, SOLUTION INTRAVENOUS at 05:07

## 2019-12-05 RX ADMIN — METOPROLOL TARTRATE 12.5 MG: 25 TABLET ORAL at 20:56

## 2019-12-05 RX ADMIN — SODIUM CHLORIDE, PRESERVATIVE FREE 10 ML: 5 INJECTION INTRAVENOUS at 20:55

## 2019-12-05 ASSESSMENT — PAIN SCALES - GENERAL
PAINLEVEL_OUTOF10: 0

## 2019-12-06 VITALS
TEMPERATURE: 97 F | BODY MASS INDEX: 24.11 KG/M2 | HEIGHT: 65 IN | DIASTOLIC BLOOD PRESSURE: 61 MMHG | WEIGHT: 144.7 LBS | HEART RATE: 90 BPM | OXYGEN SATURATION: 93 % | SYSTOLIC BLOOD PRESSURE: 119 MMHG | RESPIRATION RATE: 18 BRPM

## 2019-12-06 LAB
ANION GAP SERPL CALCULATED.3IONS-SCNC: 10 MEQ/L (ref 8–16)
BASOPHILS # BLD: 0.6 %
BASOPHILS ABSOLUTE: 0 THOU/MM3 (ref 0–0.1)
BUN BLDV-MCNC: 14 MG/DL (ref 7–22)
CALCIUM SERPL-MCNC: 9 MG/DL (ref 8.5–10.5)
CHLORIDE BLD-SCNC: 106 MEQ/L (ref 98–111)
CO2: 21 MEQ/L (ref 23–33)
CREAT SERPL-MCNC: 0.5 MG/DL (ref 0.4–1.2)
EOSINOPHIL # BLD: 2.7 %
EOSINOPHILS ABSOLUTE: 0.2 THOU/MM3 (ref 0–0.4)
ERYTHROCYTE [DISTWIDTH] IN BLOOD BY AUTOMATED COUNT: 14.5 % (ref 11.5–14.5)
ERYTHROCYTE [DISTWIDTH] IN BLOOD BY AUTOMATED COUNT: 49.1 FL (ref 35–45)
GFR SERPL CREATININE-BSD FRML MDRD: > 90 ML/MIN/1.73M2
GLUCOSE BLD-MCNC: 113 MG/DL (ref 70–108)
GLUCOSE BLD-MCNC: 142 MG/DL (ref 70–108)
HCT VFR BLD CALC: 41.8 % (ref 37–47)
HEMOGLOBIN: 13.4 GM/DL (ref 12–16)
IMMATURE GRANS (ABS): 0.07 THOU/MM3 (ref 0–0.07)
IMMATURE GRANULOCYTES: 1 %
LYMPHOCYTES # BLD: 20.3 %
LYMPHOCYTES ABSOLUTE: 1.4 THOU/MM3 (ref 1–4.8)
MAGNESIUM: 1.8 MG/DL (ref 1.6–2.4)
MCH RBC QN AUTO: 30.2 PG (ref 26–33)
MCHC RBC AUTO-ENTMCNC: 32.1 GM/DL (ref 32.2–35.5)
MCV RBC AUTO: 94.1 FL (ref 81–99)
MONOCYTES # BLD: 11.6 %
MONOCYTES ABSOLUTE: 0.8 THOU/MM3 (ref 0.4–1.3)
NUCLEATED RED BLOOD CELLS: 0 /100 WBC
PHOSPHORUS: 3.5 MG/DL (ref 2.4–4.7)
PLATELET # BLD: 165 THOU/MM3 (ref 130–400)
PMV BLD AUTO: 11.6 FL (ref 9.4–12.4)
POTASSIUM SERPL-SCNC: 3.7 MEQ/L (ref 3.5–5.2)
RBC # BLD: 4.44 MILL/MM3 (ref 4.2–5.4)
SEG NEUTROPHILS: 63.8 %
SEGMENTED NEUTROPHILS ABSOLUTE COUNT: 4.5 THOU/MM3 (ref 1.8–7.7)
SODIUM BLD-SCNC: 137 MEQ/L (ref 135–145)
WBC # BLD: 7.1 THOU/MM3 (ref 4.8–10.8)

## 2019-12-06 PROCEDURE — 99239 HOSP IP/OBS DSCHRG MGMT >30: CPT | Performed by: INTERNAL MEDICINE

## 2019-12-06 PROCEDURE — 92526 ORAL FUNCTION THERAPY: CPT

## 2019-12-06 PROCEDURE — 2580000003 HC RX 258: Performed by: NURSE PRACTITIONER

## 2019-12-06 PROCEDURE — 6370000000 HC RX 637 (ALT 250 FOR IP): Performed by: INTERNAL MEDICINE

## 2019-12-06 PROCEDURE — 80048 BASIC METABOLIC PNL TOTAL CA: CPT

## 2019-12-06 PROCEDURE — 83735 ASSAY OF MAGNESIUM: CPT

## 2019-12-06 PROCEDURE — 36415 COLL VENOUS BLD VENIPUNCTURE: CPT

## 2019-12-06 PROCEDURE — 6370000000 HC RX 637 (ALT 250 FOR IP): Performed by: PSYCHIATRY & NEUROLOGY

## 2019-12-06 PROCEDURE — 84100 ASSAY OF PHOSPHORUS: CPT

## 2019-12-06 PROCEDURE — 93270 REMOTE 30 DAY ECG REV/REPORT: CPT

## 2019-12-06 PROCEDURE — 82948 REAGENT STRIP/BLOOD GLUCOSE: CPT

## 2019-12-06 PROCEDURE — 2709999900 HC NON-CHARGEABLE SUPPLY

## 2019-12-06 PROCEDURE — 85025 COMPLETE CBC W/AUTO DIFF WBC: CPT

## 2019-12-06 PROCEDURE — 94760 N-INVAS EAR/PLS OXIMETRY 1: CPT

## 2019-12-06 RX ORDER — LEVETIRACETAM 100 MG/ML
500 SOLUTION ORAL EVERY 12 HOURS
DISCHARGE
Start: 2019-12-06

## 2019-12-06 RX ORDER — NICOTINE POLACRILEX 4 MG
15 LOZENGE BUCCAL PRN
Status: DISCONTINUED | OUTPATIENT
Start: 2019-12-06 | End: 2019-12-06 | Stop reason: SDUPTHER

## 2019-12-06 RX ORDER — DEXTROSE MONOHYDRATE 25 G/50ML
12.5 INJECTION, SOLUTION INTRAVENOUS PRN
Status: DISCONTINUED | OUTPATIENT
Start: 2019-12-06 | End: 2019-12-06 | Stop reason: SDUPTHER

## 2019-12-06 RX ORDER — DEXTROSE MONOHYDRATE 50 MG/ML
100 INJECTION, SOLUTION INTRAVENOUS PRN
Status: DISCONTINUED | OUTPATIENT
Start: 2019-12-06 | End: 2019-12-06 | Stop reason: SDUPTHER

## 2019-12-06 RX ADMIN — LEVETIRACETAM 500 MG: 500 SOLUTION ORAL at 06:39

## 2019-12-06 RX ADMIN — Medication 1000 MCG: at 08:04

## 2019-12-06 RX ADMIN — POTASSIUM BICARBONATE 40 MEQ: 782 TABLET, EFFERVESCENT ORAL at 08:04

## 2019-12-06 RX ADMIN — SODIUM CHLORIDE, PRESERVATIVE FREE 10 ML: 5 INJECTION INTRAVENOUS at 08:04

## 2019-12-06 RX ADMIN — METOPROLOL TARTRATE 12.5 MG: 25 TABLET ORAL at 08:04

## 2019-12-06 ASSESSMENT — PAIN SCALES - GENERAL
PAINLEVEL_OUTOF10: 0
PAINLEVEL_OUTOF10: 0

## 2019-12-10 ENCOUNTER — APPOINTMENT (OUTPATIENT)
Dept: CT IMAGING | Age: 79
DRG: 189 | End: 2019-12-10
Payer: MEDICARE

## 2019-12-10 ENCOUNTER — APPOINTMENT (OUTPATIENT)
Dept: INTERVENTIONAL RADIOLOGY/VASCULAR | Age: 79
DRG: 189 | End: 2019-12-10
Payer: MEDICARE

## 2019-12-10 ENCOUNTER — APPOINTMENT (OUTPATIENT)
Dept: GENERAL RADIOLOGY | Age: 79
DRG: 189 | End: 2019-12-10
Payer: MEDICARE

## 2019-12-10 ENCOUNTER — HOSPITAL ENCOUNTER (INPATIENT)
Age: 79
LOS: 3 days | Discharge: NURSING, REHAB OR LONG-TERM CARE | DRG: 189 | End: 2019-12-13
Attending: EMERGENCY MEDICINE | Admitting: INTERNAL MEDICINE
Payer: MEDICARE

## 2019-12-10 DIAGNOSIS — E86.0 DEHYDRATION: ICD-10-CM

## 2019-12-10 DIAGNOSIS — R40.4 TRANSIENT ALTERATION OF AWARENESS: Primary | ICD-10-CM

## 2019-12-10 DIAGNOSIS — I95.89 HYPOTENSION DUE TO HYPOVOLEMIA: ICD-10-CM

## 2019-12-10 DIAGNOSIS — E86.1 HYPOTENSION DUE TO HYPOVOLEMIA: ICD-10-CM

## 2019-12-10 PROBLEM — R41.0 CONFUSION: Status: ACTIVE | Noted: 2019-12-10

## 2019-12-10 LAB
ABO: NORMAL
ALBUMIN SERPL-MCNC: 3 G/DL (ref 3.5–5.1)
ALLEN TEST: POSITIVE
ALP BLD-CCNC: 84 U/L (ref 38–126)
ALT SERPL-CCNC: 36 U/L (ref 11–66)
AMORPHOUS: ABNORMAL
ANION GAP SERPL CALCULATED.3IONS-SCNC: 15 MEQ/L (ref 8–16)
ANTIBODY SCREEN: NORMAL
AST SERPL-CCNC: 27 U/L (ref 5–40)
BACTERIA: ABNORMAL
BASE EXCESS (CALCULATED): -0.9 MMOL/L (ref -2.5–2.5)
BASOPHILS # BLD: 0.5 %
BASOPHILS ABSOLUTE: 0 THOU/MM3 (ref 0–0.1)
BILIRUB SERPL-MCNC: 0.3 MG/DL (ref 0.3–1.2)
BILIRUBIN URINE: NEGATIVE
BLOOD, URINE: NEGATIVE
BUN BLDV-MCNC: 26 MG/DL (ref 7–22)
CALCIUM SERPL-MCNC: 8.9 MG/DL (ref 8.5–10.5)
CASTS: ABNORMAL /LPF
CHARACTER, URINE: ABNORMAL
CHLORIDE BLD-SCNC: 104 MEQ/L (ref 98–111)
CO2: 23 MEQ/L (ref 23–33)
COLLECTED BY:: ABNORMAL
COLOR: YELLOW
CREAT SERPL-MCNC: 0.6 MG/DL (ref 0.4–1.2)
CRYSTALS: ABNORMAL
DEVICE: ABNORMAL
EKG ATRIAL RATE: 96 BPM
EKG P AXIS: 73 DEGREES
EKG P-R INTERVAL: 192 MS
EKG Q-T INTERVAL: 356 MS
EKG QRS DURATION: 80 MS
EKG QTC CALCULATION (BAZETT): 449 MS
EKG R AXIS: -19 DEGREES
EKG T AXIS: 115 DEGREES
EKG VENTRICULAR RATE: 96 BPM
EOSINOPHIL # BLD: 2 %
EOSINOPHILS ABSOLUTE: 0.2 THOU/MM3 (ref 0–0.4)
EPITHELIAL CELLS, UA: ABNORMAL /HPF
ERYTHROCYTE [DISTWIDTH] IN BLOOD BY AUTOMATED COUNT: 15 % (ref 11.5–14.5)
ERYTHROCYTE [DISTWIDTH] IN BLOOD BY AUTOMATED COUNT: 51.5 FL (ref 35–45)
GFR SERPL CREATININE-BSD FRML MDRD: > 90 ML/MIN/1.73M2
GLUCOSE BLD-MCNC: 144 MG/DL (ref 70–108)
GLUCOSE BLD-MCNC: 146 MG/DL (ref 70–108)
GLUCOSE, URINE: NEGATIVE MG/DL
HCO3: 23 MMOL/L (ref 23–28)
HCT VFR BLD CALC: 36.4 % (ref 37–47)
HEMOGLOBIN: 11.8 GM/DL (ref 12–16)
IFIO2: 4
IMMATURE GRANS (ABS): 0.07 THOU/MM3 (ref 0–0.07)
IMMATURE GRANULOCYTES: 0.8 %
KETONES, URINE: NEGATIVE
LACTIC ACID, SEPSIS: 1.2 MMOL/L (ref 0.5–1.9)
LEUKOCYTE EST, POC: ABNORMAL
LYMPHOCYTES # BLD: 22.2 %
LYMPHOCYTES ABSOLUTE: 1.9 THOU/MM3 (ref 1–4.8)
MCH RBC QN AUTO: 30.3 PG (ref 26–33)
MCHC RBC AUTO-ENTMCNC: 32.4 GM/DL (ref 32.2–35.5)
MCV RBC AUTO: 93.3 FL (ref 81–99)
MONOCYTES # BLD: 12.3 %
MONOCYTES ABSOLUTE: 1.1 THOU/MM3 (ref 0.4–1.3)
MUCUS: ABNORMAL
NITRITE, URINE: NEGATIVE
NUCLEATED RED BLOOD CELLS: 0 /100 WBC
O2 SATURATION: 99 %
OSMOLALITY CALCULATION: 290.5 MOSMOL/KG (ref 275–300)
PCO2: 34 MMHG (ref 35–45)
PH BLOOD GAS: 7.44 (ref 7.35–7.45)
PH UA: 7.5 (ref 5–9)
PLATELET # BLD: 249 THOU/MM3 (ref 130–400)
PMV BLD AUTO: 10.8 FL (ref 9.4–12.4)
PO2: 123 MMHG (ref 71–104)
POTASSIUM REFLEX MAGNESIUM: 4.5 MEQ/L (ref 3.5–5.2)
PROCALCITONIN: 0.13 NG/ML (ref 0.01–0.09)
PROTEIN UA: NEGATIVE MG/DL
RBC # BLD: 3.9 MILL/MM3 (ref 4.2–5.4)
RBC URINE: ABNORMAL /HPF
RH FACTOR: NORMAL
SEG NEUTROPHILS: 62.2 %
SEGMENTED NEUTROPHILS ABSOLUTE COUNT: 5.3 THOU/MM3 (ref 1.8–7.7)
SODIUM BLD-SCNC: 142 MEQ/L (ref 135–145)
SOURCE, BLOOD GAS: ABNORMAL
SPECIFIC GRAVITY UA: 1.01 (ref 1–1.03)
TOTAL PROTEIN: 5.6 G/DL (ref 6.1–8)
UROBILINOGEN, URINE: 0.2 EU/DL (ref 0–1)
WBC # BLD: 8.6 THOU/MM3 (ref 4.8–10.8)
WBC UA: ABNORMAL /HPF

## 2019-12-10 PROCEDURE — 93971 EXTREMITY STUDY: CPT

## 2019-12-10 PROCEDURE — 93005 ELECTROCARDIOGRAM TRACING: CPT | Performed by: EMERGENCY MEDICINE

## 2019-12-10 PROCEDURE — 71045 X-RAY EXAM CHEST 1 VIEW: CPT

## 2019-12-10 PROCEDURE — 87077 CULTURE AEROBIC IDENTIFY: CPT

## 2019-12-10 PROCEDURE — 87086 URINE CULTURE/COLONY COUNT: CPT

## 2019-12-10 PROCEDURE — 94761 N-INVAS EAR/PLS OXIMETRY MLT: CPT

## 2019-12-10 PROCEDURE — 2580000003 HC RX 258: Performed by: INTERNAL MEDICINE

## 2019-12-10 PROCEDURE — 70450 CT HEAD/BRAIN W/O DYE: CPT

## 2019-12-10 PROCEDURE — 99285 EMERGENCY DEPT VISIT HI MDM: CPT

## 2019-12-10 PROCEDURE — 86900 BLOOD TYPING SEROLOGIC ABO: CPT

## 2019-12-10 PROCEDURE — 2700000000 HC OXYGEN THERAPY PER DAY

## 2019-12-10 PROCEDURE — 86850 RBC ANTIBODY SCREEN: CPT

## 2019-12-10 PROCEDURE — 83605 ASSAY OF LACTIC ACID: CPT

## 2019-12-10 PROCEDURE — 36415 COLL VENOUS BLD VENIPUNCTURE: CPT

## 2019-12-10 PROCEDURE — 96367 TX/PROPH/DG ADDL SEQ IV INF: CPT

## 2019-12-10 PROCEDURE — 71275 CT ANGIOGRAPHY CHEST: CPT

## 2019-12-10 PROCEDURE — 80053 COMPREHEN METABOLIC PANEL: CPT

## 2019-12-10 PROCEDURE — 99223 1ST HOSP IP/OBS HIGH 75: CPT | Performed by: INTERNAL MEDICINE

## 2019-12-10 PROCEDURE — 2709999900 HC NON-CHARGEABLE SUPPLY

## 2019-12-10 PROCEDURE — 82948 REAGENT STRIP/BLOOD GLUCOSE: CPT

## 2019-12-10 PROCEDURE — 51701 INSERT BLADDER CATHETER: CPT

## 2019-12-10 PROCEDURE — 85025 COMPLETE CBC W/AUTO DIFF WBC: CPT

## 2019-12-10 PROCEDURE — 2580000003 HC RX 258: Performed by: EMERGENCY MEDICINE

## 2019-12-10 PROCEDURE — 87040 BLOOD CULTURE FOR BACTERIA: CPT

## 2019-12-10 PROCEDURE — 86901 BLOOD TYPING SEROLOGIC RH(D): CPT

## 2019-12-10 PROCEDURE — 84145 PROCALCITONIN (PCT): CPT

## 2019-12-10 PROCEDURE — 1200000003 HC TELEMETRY R&B

## 2019-12-10 PROCEDURE — 81001 URINALYSIS AUTO W/SCOPE: CPT

## 2019-12-10 PROCEDURE — 96365 THER/PROPH/DIAG IV INF INIT: CPT

## 2019-12-10 PROCEDURE — 6360000002 HC RX W HCPCS: Performed by: INTERNAL MEDICINE

## 2019-12-10 PROCEDURE — 82803 BLOOD GASES ANY COMBINATION: CPT

## 2019-12-10 PROCEDURE — 93010 ELECTROCARDIOGRAM REPORT: CPT | Performed by: NUCLEAR MEDICINE

## 2019-12-10 PROCEDURE — 36600 WITHDRAWAL OF ARTERIAL BLOOD: CPT

## 2019-12-10 PROCEDURE — 6360000004 HC RX CONTRAST MEDICATION: Performed by: EMERGENCY MEDICINE

## 2019-12-10 PROCEDURE — 6360000002 HC RX W HCPCS: Performed by: EMERGENCY MEDICINE

## 2019-12-10 RX ORDER — LEVETIRACETAM 100 MG/ML
500 SOLUTION ORAL EVERY 12 HOURS
Status: DISCONTINUED | OUTPATIENT
Start: 2019-12-10 | End: 2019-12-13 | Stop reason: HOSPADM

## 2019-12-10 RX ORDER — ONDANSETRON 2 MG/ML
4 INJECTION INTRAMUSCULAR; INTRAVENOUS EVERY 6 HOURS PRN
Status: DISCONTINUED | OUTPATIENT
Start: 2019-12-10 | End: 2019-12-13 | Stop reason: HOSPADM

## 2019-12-10 RX ORDER — SODIUM CHLORIDE 9 MG/ML
INJECTION, SOLUTION INTRAVENOUS CONTINUOUS
Status: DISCONTINUED | OUTPATIENT
Start: 2019-12-10 | End: 2019-12-13 | Stop reason: HOSPADM

## 2019-12-10 RX ORDER — IBUPROFEN 400 MG/1
400 TABLET ORAL EVERY 6 HOURS PRN
Status: DISCONTINUED | OUTPATIENT
Start: 2019-12-10 | End: 2019-12-13 | Stop reason: HOSPADM

## 2019-12-10 RX ORDER — SODIUM CHLORIDE 0.9 % (FLUSH) 0.9 %
10 SYRINGE (ML) INJECTION EVERY 12 HOURS SCHEDULED
Status: DISCONTINUED | OUTPATIENT
Start: 2019-12-10 | End: 2019-12-13 | Stop reason: HOSPADM

## 2019-12-10 RX ORDER — SODIUM CHLORIDE 0.9 % (FLUSH) 0.9 %
10 SYRINGE (ML) INJECTION PRN
Status: DISCONTINUED | OUTPATIENT
Start: 2019-12-10 | End: 2019-12-13 | Stop reason: HOSPADM

## 2019-12-10 RX ORDER — 0.9 % SODIUM CHLORIDE 0.9 %
30 INTRAVENOUS SOLUTION INTRAVENOUS ONCE
Status: COMPLETED | OUTPATIENT
Start: 2019-12-10 | End: 2019-12-10

## 2019-12-10 RX ORDER — NICOTINE 21 MG/24HR
1 PATCH, TRANSDERMAL 24 HOURS TRANSDERMAL DAILY PRN
Status: DISCONTINUED | OUTPATIENT
Start: 2019-12-10 | End: 2019-12-13 | Stop reason: HOSPADM

## 2019-12-10 RX ADMIN — SODIUM CHLORIDE 1950 ML: 9 INJECTION, SOLUTION INTRAVENOUS at 15:40

## 2019-12-10 RX ADMIN — CEFEPIME HYDROCHLORIDE 2 G: 2 INJECTION, POWDER, FOR SOLUTION INTRAVENOUS at 22:23

## 2019-12-10 RX ADMIN — IOPAMIDOL 80 ML: 755 INJECTION, SOLUTION INTRAVENOUS at 17:32

## 2019-12-10 RX ADMIN — Medication 10 ML: at 22:23

## 2019-12-10 RX ADMIN — PIPERACILLIN SODIUM,TAZOBACTAM SODIUM 4.5 G: 4; .5 INJECTION, POWDER, FOR SOLUTION INTRAVENOUS at 16:27

## 2019-12-10 RX ADMIN — SODIUM CHLORIDE: 9 INJECTION, SOLUTION INTRAVENOUS at 22:23

## 2019-12-10 RX ADMIN — VANCOMYCIN HYDROCHLORIDE 1250 MG: 5 INJECTION, POWDER, LYOPHILIZED, FOR SOLUTION INTRAVENOUS at 17:54

## 2019-12-10 ASSESSMENT — PAIN SCALES - WONG BAKER: WONGBAKER_NUMERICALRESPONSE: 0

## 2019-12-10 ASSESSMENT — PAIN SCALES - GENERAL: PAINLEVEL_OUTOF10: 0

## 2019-12-11 ENCOUNTER — APPOINTMENT (OUTPATIENT)
Dept: GENERAL RADIOLOGY | Age: 79
DRG: 189 | End: 2019-12-11
Payer: MEDICARE

## 2019-12-11 PROBLEM — R65.10 SIRS (SYSTEMIC INFLAMMATORY RESPONSE SYNDROME) (HCC): Status: ACTIVE | Noted: 2019-11-28

## 2019-12-11 LAB
ANION GAP SERPL CALCULATED.3IONS-SCNC: 15 MEQ/L (ref 8–16)
BUN BLDV-MCNC: 17 MG/DL (ref 7–22)
CALCIUM SERPL-MCNC: 8.5 MG/DL (ref 8.5–10.5)
CHLORIDE BLD-SCNC: 102 MEQ/L (ref 98–111)
CO2: 20 MEQ/L (ref 23–33)
CREAT SERPL-MCNC: 0.5 MG/DL (ref 0.4–1.2)
ERYTHROCYTE [DISTWIDTH] IN BLOOD BY AUTOMATED COUNT: 14.6 % (ref 11.5–14.5)
ERYTHROCYTE [DISTWIDTH] IN BLOOD BY AUTOMATED COUNT: 49.7 FL (ref 35–45)
GFR SERPL CREATININE-BSD FRML MDRD: > 90 ML/MIN/1.73M2
GLUCOSE BLD-MCNC: 107 MG/DL (ref 70–108)
GLUCOSE BLD-MCNC: 111 MG/DL (ref 70–108)
GLUCOSE BLD-MCNC: 118 MG/DL (ref 70–108)
GLUCOSE BLD-MCNC: 119 MG/DL (ref 70–108)
GLUCOSE BLD-MCNC: 151 MG/DL (ref 70–108)
HCT VFR BLD CALC: 36.1 % (ref 37–47)
HEMOGLOBIN: 11.6 GM/DL (ref 12–16)
LACTIC ACID: 1.2 MMOL/L (ref 0.5–2.2)
MCH RBC QN AUTO: 30 PG (ref 26–33)
MCHC RBC AUTO-ENTMCNC: 32.1 GM/DL (ref 32.2–35.5)
MCV RBC AUTO: 93.3 FL (ref 81–99)
PLATELET # BLD: 233 THOU/MM3 (ref 130–400)
PMV BLD AUTO: 11 FL (ref 9.4–12.4)
POTASSIUM REFLEX MAGNESIUM: 3.8 MEQ/L (ref 3.5–5.2)
RBC # BLD: 3.87 MILL/MM3 (ref 4.2–5.4)
SODIUM BLD-SCNC: 137 MEQ/L (ref 135–145)
WBC # BLD: 6.7 THOU/MM3 (ref 4.8–10.8)

## 2019-12-11 PROCEDURE — 36415 COLL VENOUS BLD VENIPUNCTURE: CPT

## 2019-12-11 PROCEDURE — 83605 ASSAY OF LACTIC ACID: CPT

## 2019-12-11 PROCEDURE — 2700000000 HC OXYGEN THERAPY PER DAY

## 2019-12-11 PROCEDURE — 97162 PT EVAL MOD COMPLEX 30 MIN: CPT

## 2019-12-11 PROCEDURE — 6370000000 HC RX 637 (ALT 250 FOR IP): Performed by: INTERNAL MEDICINE

## 2019-12-11 PROCEDURE — 97530 THERAPEUTIC ACTIVITIES: CPT

## 2019-12-11 PROCEDURE — 71045 X-RAY EXAM CHEST 1 VIEW: CPT

## 2019-12-11 PROCEDURE — 82948 REAGENT STRIP/BLOOD GLUCOSE: CPT

## 2019-12-11 PROCEDURE — 99233 SBSQ HOSP IP/OBS HIGH 50: CPT | Performed by: PHYSICIAN ASSISTANT

## 2019-12-11 PROCEDURE — 6360000002 HC RX W HCPCS: Performed by: INTERNAL MEDICINE

## 2019-12-11 PROCEDURE — 80048 BASIC METABOLIC PNL TOTAL CA: CPT

## 2019-12-11 PROCEDURE — 1200000003 HC TELEMETRY R&B

## 2019-12-11 PROCEDURE — 94761 N-INVAS EAR/PLS OXIMETRY MLT: CPT

## 2019-12-11 PROCEDURE — 85027 COMPLETE CBC AUTOMATED: CPT

## 2019-12-11 PROCEDURE — 2580000003 HC RX 258: Performed by: INTERNAL MEDICINE

## 2019-12-11 RX ORDER — NICOTINE POLACRILEX 4 MG
15 LOZENGE BUCCAL PRN
Status: DISCONTINUED | OUTPATIENT
Start: 2019-12-11 | End: 2019-12-13 | Stop reason: HOSPADM

## 2019-12-11 RX ORDER — DEXTROSE MONOHYDRATE 50 MG/ML
100 INJECTION, SOLUTION INTRAVENOUS PRN
Status: DISCONTINUED | OUTPATIENT
Start: 2019-12-11 | End: 2019-12-13 | Stop reason: HOSPADM

## 2019-12-11 RX ORDER — DEXTROSE MONOHYDRATE 25 G/50ML
12.5 INJECTION, SOLUTION INTRAVENOUS PRN
Status: DISCONTINUED | OUTPATIENT
Start: 2019-12-11 | End: 2019-12-13 | Stop reason: HOSPADM

## 2019-12-11 RX ADMIN — CEFEPIME HYDROCHLORIDE 2 G: 2 INJECTION, POWDER, FOR SOLUTION INTRAVENOUS at 08:31

## 2019-12-11 RX ADMIN — LEVETIRACETAM 500 MG: 500 SOLUTION ORAL at 22:45

## 2019-12-11 RX ADMIN — SODIUM CHLORIDE: 9 INJECTION, SOLUTION INTRAVENOUS at 22:46

## 2019-12-11 RX ADMIN — METOPROLOL TARTRATE 12.5 MG: 25 TABLET ORAL at 00:15

## 2019-12-11 RX ADMIN — METOPROLOL TARTRATE 12.5 MG: 25 TABLET ORAL at 20:35

## 2019-12-11 RX ADMIN — LEVETIRACETAM 500 MG: 500 SOLUTION ORAL at 10:39

## 2019-12-11 RX ADMIN — VANCOMYCIN HYDROCHLORIDE 1000 MG: 1 INJECTION, POWDER, LYOPHILIZED, FOR SOLUTION INTRAVENOUS at 04:32

## 2019-12-11 RX ADMIN — ENOXAPARIN SODIUM 40 MG: 40 INJECTION SUBCUTANEOUS at 08:31

## 2019-12-11 RX ADMIN — LEVETIRACETAM 500 MG: 500 SOLUTION ORAL at 00:15

## 2019-12-11 RX ADMIN — METOPROLOL TARTRATE 12.5 MG: 25 TABLET ORAL at 10:39

## 2019-12-12 LAB
GLUCOSE BLD-MCNC: 114 MG/DL (ref 70–108)
GLUCOSE BLD-MCNC: 150 MG/DL (ref 70–108)
GLUCOSE BLD-MCNC: 151 MG/DL (ref 70–108)
GLUCOSE BLD-MCNC: 153 MG/DL (ref 70–108)
HCT VFR BLD CALC: 35.3 % (ref 37–47)
HEMOGLOBIN: 11.3 GM/DL (ref 12–16)
PROCALCITONIN: 0.07 NG/ML (ref 0.01–0.09)

## 2019-12-12 PROCEDURE — 6370000000 HC RX 637 (ALT 250 FOR IP): Performed by: INTERNAL MEDICINE

## 2019-12-12 PROCEDURE — 85018 HEMOGLOBIN: CPT

## 2019-12-12 PROCEDURE — 82948 REAGENT STRIP/BLOOD GLUCOSE: CPT

## 2019-12-12 PROCEDURE — 2580000003 HC RX 258: Performed by: INTERNAL MEDICINE

## 2019-12-12 PROCEDURE — 6360000002 HC RX W HCPCS: Performed by: INTERNAL MEDICINE

## 2019-12-12 PROCEDURE — 85014 HEMATOCRIT: CPT

## 2019-12-12 PROCEDURE — 99238 HOSP IP/OBS DSCHRG MGMT 30/<: CPT | Performed by: PHYSICIAN ASSISTANT

## 2019-12-12 PROCEDURE — 97110 THERAPEUTIC EXERCISES: CPT

## 2019-12-12 PROCEDURE — 84145 PROCALCITONIN (PCT): CPT

## 2019-12-12 PROCEDURE — 6370000000 HC RX 637 (ALT 250 FOR IP): Performed by: PHYSICIAN ASSISTANT

## 2019-12-12 PROCEDURE — 1200000003 HC TELEMETRY R&B

## 2019-12-12 PROCEDURE — 97530 THERAPEUTIC ACTIVITIES: CPT

## 2019-12-12 PROCEDURE — 36415 COLL VENOUS BLD VENIPUNCTURE: CPT

## 2019-12-12 RX ADMIN — LEVETIRACETAM 500 MG: 500 SOLUTION ORAL at 10:41

## 2019-12-12 RX ADMIN — SODIUM CHLORIDE: 9 INJECTION, SOLUTION INTRAVENOUS at 07:50

## 2019-12-12 RX ADMIN — METOPROLOL TARTRATE 12.5 MG: 25 TABLET ORAL at 07:37

## 2019-12-12 RX ADMIN — METOPROLOL TARTRATE 12.5 MG: 25 TABLET ORAL at 20:10

## 2019-12-12 RX ADMIN — INSULIN LISPRO 1 UNITS: 100 INJECTION, SOLUTION INTRAVENOUS; SUBCUTANEOUS at 16:33

## 2019-12-12 RX ADMIN — ENOXAPARIN SODIUM 40 MG: 40 INJECTION SUBCUTANEOUS at 07:36

## 2019-12-12 RX ADMIN — LEVETIRACETAM 500 MG: 500 SOLUTION ORAL at 21:00

## 2019-12-12 ASSESSMENT — PAIN SCALES - GENERAL: PAINLEVEL_OUTOF10: 0

## 2019-12-13 VITALS
WEIGHT: 126.3 LBS | DIASTOLIC BLOOD PRESSURE: 80 MMHG | BODY MASS INDEX: 21.04 KG/M2 | HEIGHT: 65 IN | RESPIRATION RATE: 16 BRPM | OXYGEN SATURATION: 94 % | HEART RATE: 96 BPM | TEMPERATURE: 98 F | SYSTOLIC BLOOD PRESSURE: 137 MMHG

## 2019-12-13 LAB
ANION GAP SERPL CALCULATED.3IONS-SCNC: 11 MEQ/L (ref 8–16)
BUN BLDV-MCNC: 12 MG/DL (ref 7–22)
CALCIUM SERPL-MCNC: 8.5 MG/DL (ref 8.5–10.5)
CHLORIDE BLD-SCNC: 108 MEQ/L (ref 98–111)
CO2: 20 MEQ/L (ref 23–33)
CREAT SERPL-MCNC: 0.3 MG/DL (ref 0.4–1.2)
ERYTHROCYTE [DISTWIDTH] IN BLOOD BY AUTOMATED COUNT: 14.1 % (ref 11.5–14.5)
ERYTHROCYTE [DISTWIDTH] IN BLOOD BY AUTOMATED COUNT: 47.7 FL (ref 35–45)
GFR SERPL CREATININE-BSD FRML MDRD: > 90 ML/MIN/1.73M2
GLUCOSE BLD-MCNC: 132 MG/DL (ref 70–108)
GLUCOSE BLD-MCNC: 153 MG/DL (ref 70–108)
HCT VFR BLD CALC: 36.4 % (ref 37–47)
HEMOGLOBIN: 11.7 GM/DL (ref 12–16)
MCH RBC QN AUTO: 30 PG (ref 26–33)
MCHC RBC AUTO-ENTMCNC: 32.1 GM/DL (ref 32.2–35.5)
MCV RBC AUTO: 93.3 FL (ref 81–99)
ORGANISM: ABNORMAL
PLATELET # BLD: 246 THOU/MM3 (ref 130–400)
PMV BLD AUTO: 10.8 FL (ref 9.4–12.4)
POTASSIUM SERPL-SCNC: 3.4 MEQ/L (ref 3.5–5.2)
RBC # BLD: 3.9 MILL/MM3 (ref 4.2–5.4)
SODIUM BLD-SCNC: 139 MEQ/L (ref 135–145)
URINE CULTURE, ROUTINE: ABNORMAL
URINE CULTURE, ROUTINE: ABNORMAL
WBC # BLD: 5 THOU/MM3 (ref 4.8–10.8)

## 2019-12-13 PROCEDURE — 6370000000 HC RX 637 (ALT 250 FOR IP): Performed by: INTERNAL MEDICINE

## 2019-12-13 PROCEDURE — 80048 BASIC METABOLIC PNL TOTAL CA: CPT

## 2019-12-13 PROCEDURE — 85027 COMPLETE CBC AUTOMATED: CPT

## 2019-12-13 PROCEDURE — 82948 REAGENT STRIP/BLOOD GLUCOSE: CPT

## 2019-12-13 PROCEDURE — 97112 NEUROMUSCULAR REEDUCATION: CPT

## 2019-12-13 PROCEDURE — 99238 HOSP IP/OBS DSCHRG MGMT 30/<: CPT | Performed by: PHYSICIAN ASSISTANT

## 2019-12-13 PROCEDURE — 2580000003 HC RX 258: Performed by: INTERNAL MEDICINE

## 2019-12-13 PROCEDURE — 36415 COLL VENOUS BLD VENIPUNCTURE: CPT

## 2019-12-13 PROCEDURE — 97530 THERAPEUTIC ACTIVITIES: CPT

## 2019-12-13 PROCEDURE — 6360000002 HC RX W HCPCS: Performed by: INTERNAL MEDICINE

## 2019-12-13 RX ADMIN — ENOXAPARIN SODIUM 40 MG: 40 INJECTION SUBCUTANEOUS at 07:59

## 2019-12-13 RX ADMIN — LEVETIRACETAM 500 MG: 500 SOLUTION ORAL at 10:01

## 2019-12-13 RX ADMIN — SODIUM CHLORIDE: 9 INJECTION, SOLUTION INTRAVENOUS at 00:25

## 2019-12-13 RX ADMIN — METOPROLOL TARTRATE 12.5 MG: 25 TABLET ORAL at 07:58

## 2019-12-13 ASSESSMENT — PAIN SCALES - GENERAL: PAINLEVEL_OUTOF10: 0

## 2019-12-16 LAB
BLOOD CULTURE, ROUTINE: NORMAL
BLOOD CULTURE, ROUTINE: NORMAL

## 2019-12-18 ENCOUNTER — TELEPHONE (OUTPATIENT)
Dept: CARDIOLOGY CLINIC | Age: 79
End: 2019-12-18

## 2019-12-18 NOTE — TELEPHONE ENCOUNTER
Fartun patient's nurse at nursing home called stating the patient keeps pulling the event monitor off. Frantz Person asking if you still want them to try and keep it on her?

## 2019-12-28 PROBLEM — N39.0 UTI (URINARY TRACT INFECTION): Status: RESOLVED | Noted: 2019-11-28 | Resolved: 2019-12-28

## 2020-01-14 ENCOUNTER — OFFICE VISIT (OUTPATIENT)
Dept: CARDIOLOGY CLINIC | Age: 80
End: 2020-01-14
Payer: MEDICARE

## 2020-01-14 VITALS — DIASTOLIC BLOOD PRESSURE: 88 MMHG | HEART RATE: 98 BPM | SYSTOLIC BLOOD PRESSURE: 124 MMHG

## 2020-01-14 PROCEDURE — 4004F PT TOBACCO SCREEN RCVD TLK: CPT | Performed by: INTERNAL MEDICINE

## 2020-01-14 PROCEDURE — 1090F PRES/ABSN URINE INCON ASSESS: CPT | Performed by: INTERNAL MEDICINE

## 2020-01-14 PROCEDURE — G8484 FLU IMMUNIZE NO ADMIN: HCPCS | Performed by: INTERNAL MEDICINE

## 2020-01-14 PROCEDURE — 4040F PNEUMOC VAC/ADMIN/RCVD: CPT | Performed by: INTERNAL MEDICINE

## 2020-01-14 PROCEDURE — 1123F ACP DISCUSS/DSCN MKR DOCD: CPT | Performed by: INTERNAL MEDICINE

## 2020-01-14 PROCEDURE — G8400 PT W/DXA NO RESULTS DOC: HCPCS | Performed by: INTERNAL MEDICINE

## 2020-01-14 PROCEDURE — 93000 ELECTROCARDIOGRAM COMPLETE: CPT | Performed by: INTERNAL MEDICINE

## 2020-01-14 PROCEDURE — G8428 CUR MEDS NOT DOCUMENT: HCPCS | Performed by: INTERNAL MEDICINE

## 2020-01-14 PROCEDURE — G8420 CALC BMI NORM PARAMETERS: HCPCS | Performed by: INTERNAL MEDICINE

## 2020-01-14 PROCEDURE — 99214 OFFICE O/P EST MOD 30 MIN: CPT | Performed by: INTERNAL MEDICINE

## 2020-01-14 RX ORDER — GUAIFENESIN 100 MG/5ML
200 SOLUTION ORAL EVERY 4 HOURS PRN
COMMUNITY

## 2020-01-14 NOTE — PROGRESS NOTES
[address] [place] [address],[place] [address] [address]  [place] [address]  Dept: [phone]  Dept Fax: [phone]  Loc: [phone]    Visit Date: 1/14/2020    Ms. Maximo Reddy is a 67 y.o. female  who presented for:  Recent hospital evaluation   ? Tachy-mariusz  pSVT      HPI:   MARIA EUGENIA Bridges is a pleasant 67year old female patient who  has a past medical history of Hypertension. She also has h/o smoking, Subdural hematoma, UTI, PNA, DM, HENRY, fall, SAH, SDH, T8 fracture. The patient had multiple recent admission for AMS. She is suspected to have underlying dementia. Cardiology was consulted during her admission, seen 12/2/2019 for pSVT and ?5 second pause on telemetry.   -Cardiac event monitor revealed Sinus rhythm. Occasional short run of PSVT, longest is for 6 beats.  -Echo reevaled preserved EF, mildly dilated left atrium   More recently, the patient was admitted to the hospital 12/10-12/13/2019 for the following per DC summary:  Discharge Diagnoses:  Suspected dementia  Acute Hypoxic Respiratory Failure   Suspected atelectasis  Uncontrolled NIDDM II  Dehydration   SIRS criteria, Sepsis ruled out  Essential HTN  Protein calorie malnutrition   Dysphagia, s/p PEG tube  The patient presents to office for follow up. Patient is non-verbal and unable to provide history at this time. EKG reveals NSR, nonspecific ST/T changes.        Current Outpatient Medications:     levETIRAcetam (KEPPRA) 100 MG/ML solution, 5 mLs by Per NG tube route every 12 hours, Disp: , Rfl:     metoprolol tartrate (LOPRESSOR) 25 MG tablet, 0.5 tablets by PEG Tube route 2 times daily, Disp: , Rfl:     vitamin B-12 1000 MCG tablet, Take 1 tablet by mouth daily, Disp: , Rfl:     potassium bicarb-citric acid (EFFER-K) 20 MEQ TBEF effervescent tablet, 2 tablets by Per G Tube route daily, Disp: , Rfl:     insulin lispro (HUMALOG) 100 UNIT/ML injection vial, Inject 0-3 Units into the skin Normocephalic and atraumatic. Eyes: EOM are normal. Pupils are equal, round, and reactive to light. Neck: Normal range of motion. Neck supple. No JVD present. Cardiovascular: Normal rate, regular rhythm, no murmur, no rubs, and intact distal pulses. Pulmonary/Chest: Effort normal and breath sounds normal. No respiratory distress. No wheezes. No rales. Abdominal: Soft. Bowel sounds are normal. No distension. There is no tenderness. Musculoskeletal: Normal range of motion. no edema. Neurological: nonverbal, moved 4 limbs, moser not follow commands, awake   Skin: Skin is warm and dry.      No results found for: CKTOTAL, CKMB, CKMBINDEX    Lab Results   Component Value Date    WBC 5.0 12/13/2019    RBC 3.90 12/13/2019    HGB 11.7 12/13/2019    HCT 36.4 12/13/2019    MCV 93.3 12/13/2019    MCH 30.0 12/13/2019    MCHC 32.1 12/13/2019     12/13/2019    MPV 10.8 12/13/2019       Lab Results   Component Value Date     12/13/2019    K 3.4 12/13/2019    K 3.8 12/11/2019     12/13/2019    CO2 20 12/13/2019    BUN 12 12/13/2019    LABALBU 3.0 12/10/2019    CREATININE 0.3 12/13/2019    CALCIUM 8.5 12/13/2019    LABGLOM >90 12/13/2019    GLUCOSE 153 12/13/2019       Lab Results   Component Value Date    ALKPHOS 84 12/10/2019    ALT 36 12/10/2019    AST 27 12/10/2019    PROT 5.6 12/10/2019    BILITOT 0.3 12/10/2019    LABALBU 3.0 12/10/2019       Lab Results   Component Value Date    MG 1.8 12/06/2019       No results found for: INR, PROTIME      Lab Results   Component Value Date    LABA1C 7.0 10/30/2019       No results found for: TRIG, HDL, LDLCALC, LDLDIRECT, LABVLDL    Lab Results   Component Value Date    TSH 3.250 10/30/2019         Testing Reviewed:      I have individually reviewed the cardiac test below:    ECHO:   Results for orders placed during the hospital encounter of 11/27/19   ECHO Complete 2D W Doppler W Color    Narrative Transthoracic Echocardiography Report (TTE) Demographics      Patient Name   Stacy Post     Gender               Female                  [facility]      MR #           [id number]     Race                 Other                                   Ethnicity             or       Account #      [de-identified]     Room Number          5129      Accession      [id number]     Date of Study        11/29/2019   Number      Date of Birth  1940    Referring Physician  MD Delvis Rosales MD      Age            67 year(s)    Sonographer          Vignesh Kwon [facility]                                   Interpreting         Echo reader of the week                                Physician            Priscila Fam MD     Procedure    Type of Study      TTE procedure:ECHOCARDIOGRAM COMPLETE 2D W DOPPLER W COLOR. Procedure Date  Date: 11/29/2019 Start: 12:29 PM    Study Location: Bedside  Technical Quality: Limited visualization due to restricted mobility. Indications:Tachycardia. Additional Medical History:Smoker, diabetes, hypokalemia, hypertension    Patient Status: Routine    Height: 66 inches Weight: 149 pounds BSA: 1.76 m^2 BMI: 24.05 kg/m^2    BP: 126/61 mmHg     Conclusions      Summary   Normal left ventricle size and systolic function. Ejection fraction was   estimated at 60 to 65 %. There were no regional left ventricular wall   motion abnormalities and wall thickness was within normal limits. The left atrium is Mildly dilated. Signature      ----------------------------------------------------------------   Electronically signed by Priscila Fam MD (Interpreting   physician) on 11/29/2019 at 05:28 PM   ----------------------------------------------------------------      Findings      Mitral Valve   The mitral valve structure was normal with normal leaflet separation. DOPPLER: The transmitral velocity was within the normal range with no   evidence for mitral stenosis.  There was no ml/31 m^2LV ESV/LV ESV   Diastolic: 1 cm Index: 27 AG/00 m^2       RV Diastolic Dimension: 3.2 cm                   EF Calculated: 50.4 %                                             LA/Aorta: 0.76                                             Ascending Aorta: 3.3 cm                                             LA volume/Index: 30.7 ml /17m^2     Doppler Measurements & Calculations      MV Peak E-Wave: 79   AV Peak Velocity: 90  LVOT Peak Velocity: 92.4 cm/s   cm/s                 cm/s                  LVOT Mean Velocity: 62.1 cm/s   MV Peak A-Wave: 97.9 AV Peak Gradient:     LVOT Peak Gradient: 3 mmHgLVOT   cm/s                 3.24 mmHg             Mean Gradient: 2 mmHg   MV E/A Ratio: 0.81   AV Mean Velocity:   MV Peak Gradient:    64.1 cm/s             TV Peak E-Wave: 47.7 cm/s   2.5 mmHg             AV Mean Gradient: 2   TV Peak A-Wave: 61.2 cm/s                        mmHg   MV Deceleration      AV VTI: 18.1 cm       TV Peak Gradient: 0.91 mmHg   Time: 148 msec                             TR Velocity:205 cm/s   MV P1/2t: 43 msec                          TR Gradient:16.81 mmHg   MVA by PHT:5.12 cm^2 LVOT VTI: 17.1 cm     PV Peak Velocity: 51.8 cm/s                        IVRT: 92 msec         PV Peak Gradient: 1.07 mmHg   MV E' Septal   Velocity: 6.4 cm/s   MV A' Septal         AV DVI (VTI): 0.94AV   Velocity: 12 cm/s    DVI (Vmax):1.03   MV E' Lateral   Velocity: 6 cm/s   MV A' Lateral   Velocity: 13.5 cm/s   E/E' septal: 12.34   E/E' lateral: 13.17     [email or web address] [id number]%3d%3d        Ekg:   EKG Interpretation:  normal EKG, normal sinus rhythm, nonspecific ST and T waves changes. 30 day event Monitor: ENROLLMENT DATE:  12/07/2019 until 01/05/2020.     INDICATION FOR STUDY:  Palpitations, tachycardia.     FINDINGS:  The patient's underlying rhythm is sinus rhythm.   Throughout  the study, the

## (undated) DEVICE — TUBING, SUCTION, 1/4" X 20', STRAIGHT: Brand: MEDLINE INDUSTRIES, INC.

## (undated) DEVICE — SET ADMIN 25ML L117IN PMP MOD CK VLV RLER CLMP 2 SMRTSITE

## (undated) DEVICE — SET LNR RED GRN W/ BASE CLEANASCOPE

## (undated) DEVICE — LINER SUCT CANSTR 1500CC SEMI RIG W/ POR HYDROPHOBIC SHUT

## (undated) DEVICE — KIT INF CTRL 2OZ LUB TBNG L12FT DBL END BRSH SYR OP4

## (undated) DEVICE — CHLORAPREP 26ML CLEAR

## (undated) DEVICE — CONMED SCOPE SAVER BITE BLOCK, 20X27 MM: Brand: SCOPE SAVER

## (undated) DEVICE — KIT PEG DIA20FR STD PUSH DISP ENDOVIVE

## (undated) DEVICE — BINDER ABD 2XL H12XL60 75IN UNISX STD E 4 PNL DISPOSABLE

## (undated) DEVICE — SOLUTION IV IRRIG WATER 500ML POUR BRL ST 2F7113

## (undated) DEVICE — BIOGUARD A/W CLEANING ADAPTER

## (undated) DEVICE — GLOVE ORANGE PI 7 1/2   MSG9075

## (undated) DEVICE — MASK O2 AD TB L7FT HI CONC PART N RBRTH W RESVR BG SFTY

## (undated) DEVICE — YANKAUER,BULB TIP,W/O VENT,RIGID,STERILE: Brand: MEDLINE

## (undated) DEVICE — SYRINGE 10ML FLUSH ST PREFILLED W/SALINE

## (undated) DEVICE — SOLUTION IV 1000ML 0.45% SOD CHL PH 5 INJ USP VIAFLX PLAS

## (undated) DEVICE — SOLUTION IV IRRIG WATER 1000ML POUR BRL 2F7114